# Patient Record
Sex: FEMALE | Race: BLACK OR AFRICAN AMERICAN | Employment: FULL TIME | URBAN - METROPOLITAN AREA
[De-identification: names, ages, dates, MRNs, and addresses within clinical notes are randomized per-mention and may not be internally consistent; named-entity substitution may affect disease eponyms.]

---

## 2017-04-10 ENCOUNTER — NURSE ONLY (OUTPATIENT)
Dept: PRIMARY CARE CLINIC | Age: 27
End: 2017-04-10

## 2017-04-10 DIAGNOSIS — Z23 NEED FOR TUBERCULOSIS VACCINATION: Primary | ICD-10-CM

## 2017-04-10 PROCEDURE — 86580 TB INTRADERMAL TEST: CPT | Performed by: INTERNAL MEDICINE

## 2017-04-18 ENCOUNTER — NURSE ONLY (OUTPATIENT)
Dept: PRIMARY CARE CLINIC | Age: 27
End: 2017-04-18

## 2017-04-18 DIAGNOSIS — Z11.1 ENCOUNTER FOR PPD TEST: Primary | ICD-10-CM

## 2017-04-18 DIAGNOSIS — Z23 NEED FOR TUBERCULOSIS VACCINATION: Primary | ICD-10-CM

## 2017-04-18 PROCEDURE — 86580 TB INTRADERMAL TEST: CPT | Performed by: INTERNAL MEDICINE

## 2017-04-24 ENCOUNTER — TELEPHONE (OUTPATIENT)
Dept: PRIMARY CARE CLINIC | Age: 27
End: 2017-04-24

## 2017-04-24 LAB
INDURATION: NORMAL
TB SKIN TEST: 0

## 2017-05-16 ENCOUNTER — OFFICE VISIT (OUTPATIENT)
Dept: PRIMARY CARE CLINIC | Age: 27
End: 2017-05-16

## 2017-05-16 VITALS
DIASTOLIC BLOOD PRESSURE: 70 MMHG | WEIGHT: 143 LBS | TEMPERATURE: 98.7 F | RESPIRATION RATE: 18 BRPM | HEIGHT: 72 IN | BODY MASS INDEX: 19.37 KG/M2 | OXYGEN SATURATION: 95 % | HEART RATE: 73 BPM | SYSTOLIC BLOOD PRESSURE: 112 MMHG

## 2017-05-16 DIAGNOSIS — Z00.00 ANNUAL PHYSICAL EXAM: Primary | ICD-10-CM

## 2017-05-16 PROCEDURE — 99395 PREV VISIT EST AGE 18-39: CPT | Performed by: INTERNAL MEDICINE

## 2017-05-16 ASSESSMENT — ENCOUNTER SYMPTOMS
VOMITING: 0
COUGH: 0
VISUAL CHANGE: 0
ABDOMINAL PAIN: 0
GASTROINTESTINAL NEGATIVE: 1
CHANGE IN BOWEL HABIT: 0
SWOLLEN GLANDS: 0
RESPIRATORY NEGATIVE: 1
SORE THROAT: 0
NAUSEA: 0
ALLERGIC/IMMUNOLOGIC NEGATIVE: 1
EYES NEGATIVE: 1

## 2017-11-08 ENCOUNTER — OFFICE VISIT (OUTPATIENT)
Dept: PRIMARY CARE CLINIC | Age: 27
End: 2017-11-08

## 2017-11-08 VITALS
DIASTOLIC BLOOD PRESSURE: 64 MMHG | HEIGHT: 72 IN | WEIGHT: 148 LBS | HEART RATE: 80 BPM | SYSTOLIC BLOOD PRESSURE: 118 MMHG | BODY MASS INDEX: 20.05 KG/M2

## 2017-11-08 DIAGNOSIS — Z00.00 ANNUAL PHYSICAL EXAM: ICD-10-CM

## 2017-11-08 PROCEDURE — 99395 PREV VISIT EST AGE 18-39: CPT | Performed by: INTERNAL MEDICINE

## 2017-11-08 RX ORDER — NORGESTIMATE AND ETHINYL ESTRADIOL 0.25-0.035
1 KIT ORAL DAILY
COMMUNITY
End: 2019-12-13 | Stop reason: ALTCHOICE

## 2017-11-08 ASSESSMENT — ENCOUNTER SYMPTOMS
ABDOMINAL PAIN: 0
RESPIRATORY NEGATIVE: 1
GASTROINTESTINAL NEGATIVE: 1
EYES NEGATIVE: 1

## 2017-11-08 ASSESSMENT — PATIENT HEALTH QUESTIONNAIRE - PHQ9
1. LITTLE INTEREST OR PLEASURE IN DOING THINGS: 0
SUM OF ALL RESPONSES TO PHQ9 QUESTIONS 1 & 2: 0
2. FEELING DOWN, DEPRESSED OR HOPELESS: 0
SUM OF ALL RESPONSES TO PHQ QUESTIONS 1-9: 0

## 2017-11-08 NOTE — PATIENT INSTRUCTIONS
Get the MMR vaccine as directed to complete your employment physical. You are medically cleared for employment pending the completion of the MMR vaccination. See me in one year.

## 2017-11-09 NOTE — PROGRESS NOTES
Bakari Medina  YOB: 1990    Date of Service:  11/8/2017    Chief Complaint   Patient presents with    Employment Physical     Needs MMR for work physical.    Other   Chronicity: Work physical. The current episode started today. The problem occurs constantly. The problem has been unchanged. Pertinent negatives include no abdominal pain, anorexia, arthralgias, fatigue, headaches, joint swelling or myalgias. Nothing aggravates the symptoms. She has tried nothing for the symptoms. The treatment provided no relief. No Known Allergies  Outpatient Prescriptions Marked as Taking for the 11/8/17 encounter (Office Visit) with Nicolas Pringle MD   Medication Sig Dispense Refill    norgestimate-ethinyl estradiol (ORTHO-CYCLEN) 0.25-35 MG-MCG per tablet Take 1 tablet by mouth daily         Immunization History   Administered Date(s) Administered    PPD Test 04/10/2017, 04/18/2017    Tdap (Boostrix, Adacel) 10/02/2014, 06/07/2016       Past Medical History:   Diagnosis Date    Chronic right-sided thoracic back pain 6/7/2016     No past surgical history on file. Family History   Problem Relation Age of Onset    High Blood Pressure Mother        Review of Systems:  Review of Systems   Constitutional: Negative. Negative for fatigue. HENT: Negative. Eyes: Negative. Respiratory: Negative. Cardiovascular: Negative. Gastrointestinal: Negative. Negative for abdominal pain and anorexia. Genitourinary: Negative. Musculoskeletal: Negative. Negative for arthralgias, joint swelling and myalgias. Skin: Negative. Neurological: Negative. Negative for headaches. Psychiatric/Behavioral: Negative. Vitals:    11/08/17 1507   BP: 118/64   Pulse: 80   Weight: 148 lb (67.1 kg)   Height: 6' (1.829 m)     Body mass index is 20.07 kg/m².      Wt Readings from Last 3 Encounters:   11/08/17 148 lb (67.1 kg)   05/16/17 143 lb (64.9 kg)   04/07/17 150 lb (68 kg)     BP Readings from Last 3 Encounters:   11/08/17 118/64   05/16/17 112/70   04/07/17 127/70         Physical Exam   Constitutional: She is oriented to person, place, and time. She appears well-developed and well-nourished. HENT:   Head: Normocephalic and atraumatic. Right Ear: External ear normal.   Left Ear: External ear normal.   Nose: Nose normal.   Eyes: Conjunctivae and EOM are normal. Pupils are equal, round, and reactive to light. Neck: Normal range of motion. Neck supple. No JVD present. No tracheal deviation present. No thyromegaly present. Cardiovascular: Normal rate, regular rhythm, normal heart sounds and intact distal pulses. Exam reveals no gallop and no friction rub. No murmur heard. Pulmonary/Chest: Effort normal and breath sounds normal. No respiratory distress. She has no wheezes. She has no rales. She exhibits no tenderness. Abdominal: Soft. Bowel sounds are normal.   Musculoskeletal: Normal range of motion. Lymphadenopathy:     She has no cervical adenopathy. Neurological: She is alert and oriented to person, place, and time. She has normal reflexes. No cranial nerve deficit. Coordination normal.   Skin: Skin is warm and dry. Psychiatric: She has a normal mood and affect. Her behavior is normal. Judgment and thought content normal.       Lab Review   No visits with results within 6 Month(s) from this visit. Latest known visit with results is:   Nurse Only on 04/18/2017   Component Date Value    TB Skin Test 04/24/2017 0.0          Health Maintenance   Topic Date Due    Flu vaccine (1) 12/15/2017 (Originally 9/1/2017)    Cervical cancer screen  06/07/2019    DTaP/Tdap/Td vaccine (2 - Td) 06/07/2026    HIV screen  Completed          Assessment/Plan:    Annual physical exam  Cleared for employment pending the MMR vaccination      1. Annual physical exam         Return in about 1 year (around 11/8/2018).

## 2018-09-26 PROBLEM — Z00.00 ANNUAL PHYSICAL EXAM: Status: RESOLVED | Noted: 2017-05-16 | Resolved: 2018-09-26

## 2018-11-05 ENCOUNTER — HOSPITAL ENCOUNTER (EMERGENCY)
Age: 28
Discharge: HOME | End: 2018-11-05
Attending: EMERGENCY MEDICINE
Payer: COMMERCIAL

## 2018-11-05 VITALS
DIASTOLIC BLOOD PRESSURE: 76 MMHG | TEMPERATURE: 98.1 F | RESPIRATION RATE: 14 BRPM | HEART RATE: 79 BPM | OXYGEN SATURATION: 99 % | WEIGHT: 140 LBS | SYSTOLIC BLOOD PRESSURE: 147 MMHG | BODY MASS INDEX: 18.96 KG/M2 | HEIGHT: 72 IN

## 2018-11-05 DIAGNOSIS — H93.11 RINGING IN EAR, RIGHT: Primary | ICD-10-CM

## 2018-11-05 PROCEDURE — 99283 EMERGENCY DEPT VISIT LOW MDM: CPT

## 2018-12-20 ENCOUNTER — OFFICE VISIT (OUTPATIENT)
Dept: PRIMARY CARE CLINIC | Age: 28
End: 2018-12-20
Payer: COMMERCIAL

## 2018-12-20 VITALS
BODY MASS INDEX: 20.32 KG/M2 | HEIGHT: 72 IN | WEIGHT: 150 LBS | RESPIRATION RATE: 18 BRPM | OXYGEN SATURATION: 99 % | TEMPERATURE: 97.9 F | SYSTOLIC BLOOD PRESSURE: 116 MMHG | DIASTOLIC BLOOD PRESSURE: 66 MMHG | HEART RATE: 83 BPM

## 2018-12-20 DIAGNOSIS — F32.89 OTHER DEPRESSION: Primary | ICD-10-CM

## 2018-12-20 DIAGNOSIS — M54.5 CHRONIC BILATERAL LOW BACK PAIN, WITH SCIATICA PRESENCE UNSPECIFIED: ICD-10-CM

## 2018-12-20 DIAGNOSIS — M54.6 CHRONIC RIGHT-SIDED THORACIC BACK PAIN: ICD-10-CM

## 2018-12-20 DIAGNOSIS — G89.29 CHRONIC BILATERAL LOW BACK PAIN, WITH SCIATICA PRESENCE UNSPECIFIED: ICD-10-CM

## 2018-12-20 DIAGNOSIS — M54.6 BILATERAL THORACIC BACK PAIN, UNSPECIFIED CHRONICITY: ICD-10-CM

## 2018-12-20 DIAGNOSIS — G89.29 CHRONIC RIGHT-SIDED THORACIC BACK PAIN: ICD-10-CM

## 2018-12-20 PROBLEM — F32.A DEPRESSION: Status: ACTIVE | Noted: 2018-12-20

## 2018-12-20 PROCEDURE — 1036F TOBACCO NON-USER: CPT | Performed by: INTERNAL MEDICINE

## 2018-12-20 PROCEDURE — G8484 FLU IMMUNIZE NO ADMIN: HCPCS | Performed by: INTERNAL MEDICINE

## 2018-12-20 PROCEDURE — 99214 OFFICE O/P EST MOD 30 MIN: CPT | Performed by: INTERNAL MEDICINE

## 2018-12-20 PROCEDURE — G8420 CALC BMI NORM PARAMETERS: HCPCS | Performed by: INTERNAL MEDICINE

## 2018-12-20 PROCEDURE — G8427 DOCREV CUR MEDS BY ELIG CLIN: HCPCS | Performed by: INTERNAL MEDICINE

## 2018-12-20 RX ORDER — METHYLPREDNISOLONE 4 MG/1
TABLET ORAL
Qty: 1 KIT | Refills: 0 | Status: SHIPPED | OUTPATIENT
Start: 2018-12-20 | End: 2018-12-26

## 2018-12-20 RX ORDER — CYCLOBENZAPRINE HCL 10 MG
10 TABLET ORAL NIGHTLY
Qty: 30 TABLET | Refills: 1 | Status: SHIPPED | OUTPATIENT
Start: 2018-12-20 | End: 2019-12-13 | Stop reason: ALTCHOICE

## 2018-12-20 RX ORDER — IBUPROFEN 800 MG/1
800 TABLET ORAL EVERY 6 HOURS PRN
Qty: 120 TABLET | Refills: 3 | Status: SHIPPED | OUTPATIENT
Start: 2018-12-20 | End: 2019-12-13 | Stop reason: ALTCHOICE

## 2018-12-20 RX ORDER — PAROXETINE HYDROCHLORIDE 20 MG/1
20 TABLET, FILM COATED ORAL EVERY MORNING
Qty: 30 TABLET | Refills: 3 | Status: SHIPPED | OUTPATIENT
Start: 2018-12-20 | End: 2019-12-13 | Stop reason: ALTCHOICE

## 2018-12-20 ASSESSMENT — ENCOUNTER SYMPTOMS
EYES NEGATIVE: 1
GASTROINTESTINAL NEGATIVE: 1
RESPIRATORY NEGATIVE: 1
BACK PAIN: 1

## 2018-12-20 ASSESSMENT — PATIENT HEALTH QUESTIONNAIRE - PHQ9
SUM OF ALL RESPONSES TO PHQ QUESTIONS 1-9: 2
1. LITTLE INTEREST OR PLEASURE IN DOING THINGS: 1
SUM OF ALL RESPONSES TO PHQ QUESTIONS 1-9: 2
SUM OF ALL RESPONSES TO PHQ9 QUESTIONS 1 & 2: 2
2. FEELING DOWN, DEPRESSED OR HOPELESS: 1

## 2018-12-20 NOTE — PATIENT INSTRUCTIONS
Instructions    A relapse of depression means your symptoms have come back after you have gotten better. This illness often comes and goes during a lifetime. But there are many things you can do to keep it from coming back. Follow-up care is a key part of your treatment and safety. Be sure to make and go to all appointments, and call your doctor if you are having problems. It's also a good idea to know your test results and keep a list of the medicines you take. What do you need to know? Know your risk of relapse  Talk to your doctor to find out if you are at risk of relapse. Many things can make a person more likely to relapse into depression. These include having a family member with depression, dealing with serious problems in a relationship or a job, having a serious medical condition, or abusing drugs or alcohol. It is important to know your risk and to recognize warning signs of relapse. Once you know these things, you will be better able to keep it from happening to you. Know the warning signs of relapse  The two most common signs of relapse are:  · Feeling sad or hopeless. · Losing interest in your daily activities. You may have other symptoms, such as:  · You lose or gain weight. · You sleep too much or not enough. · You feel restless and unable to sit still. · You feel unable to move. · You feel tired all the time. · You feel unworthy or guilty without an obvious reason. · You have problems concentrating, remembering, or making decisions. · You think often about death or suicide. · You feel angry or have panic attacks. How can you care for yourself at home? · Take your medicine as prescribed. Call your doctor if you have any problems with your medicine. Many people take their medicines for at least 6 months after they have recovered. This often helps keep symptoms from coming back.  However, if your depression keeps coming back, you may have to take medicine for the rest of your depression? Depression is an illness that affects how a person feels, thinks, and acts. It's different from normal feelings of sadness or grief. A person who has depression may have less energy. He or she may lose interest in daily activities and may feel sad and grouchy for a long time. Depression is a common illness. It affects men and women of all ages and backgrounds. Many people, and sometimes their families, feel embarrassed or ashamed about having depression. But it isn't a sign of personal weakness. It's not a character flaw. A person who is depressed is not \"crazy. \" Depression is a medical illness. It's caused by changes in the natural chemicals in the brain. Most experts believe that a combination of family history (a person's genes) and stressful life events can cause depression. Health problems may also cause depression or make it worse. It's common for people with long-term (chronic) health problems like coronary artery disease, diabetes, cancer, or chronic pain to feel depressed. It's important to know that depression can be treated. The first step toward feeling better is often just seeing that the problem exists. What are the symptoms? The symptoms of depression may be hard to notice at first. They vary among people, and it's easy to confuse them with just feeling \"off. \" The two most common symptoms are:  · Feeling sad or hopeless nearly every day for at least 2 weeks. · Losing interest in or not getting pleasure from most activities that used to be enjoyable, and feeling this way nearly every day for at least 2 weeks. Other symptoms may appear. A person with depression may, almost every day:  · Eat or sleep more or less than usual.  · Feel tired. · Feel unworthy or guilty. · Find it hard to focus, remember things, or make decisions. A serious symptom of depression is thinking about death or suicide.  If you or someone you care about talks about this or about feeling hopeless, get help your medicines exactly as prescribed. Call your doctor if you think you are having a problem with your medicine. · Do not make major life decisions while you are depressed. Depression may change the way you think. You will be able to make better decisions after you feel better. · Think positively. Challenge negative thoughts with statements such as \"I am hopeful\"; \"Things will get better\"; and \"I can ask for the help I need. \" Write down these statements and read them often, even if you don't believe them yet. · Be patient with yourself. It took time for your depression to develop, and it will take time for your symptoms to improve. Do not take on too much or be too hard on yourself. · Learn all you can about depression from written and online materials. · Check out behavioral health classes to learn more about dealing with depression. · Keep the numbers for these national suicide hotlines: 1-593-567-TALK (0-111.724.3633) and 9-355-VOVGUPG (5-844.104.7043). If you or someone you know talks about suicide or feeling hopeless, get help right away. When should you call for help? Call 911 anytime you think you may need emergency care. For example, call if:    · You feel you cannot stop from hurting yourself or someone else.   [facility] your doctor now or seek immediate medical care if:    · You hear voices.     · You feel much more depressed.    Watch closely for changes in your health, and be sure to contact your doctor if:    · You are having problems with your depression medicine.     · You are not getting better as expected. Where can you learn more? Go to https://Plan[employer] Paymentsandraeweb.[employer]. org and sign in to your [employer] account. Enter P272 in the [employer] box to learn more about \"Depression Treatment: Care Instructions. \"     If you do not have an account, please click on the \"Sign Up Now\" link. Current as of: December 7, 2017  Content Version: 11.8  © 1423-4120 Healthwise, Incorporated. takes time, and your mood will improve little by little. Stay active  · Stay busy and get outside. Take a walk, or try some other light exercise. · Talk with your doctor about an exercise program. Exercise can help with mild depression. · Go to a movie or concert. Take part in a Holiness activity or other social gathering. Go to a ball game. · Ask a friend to have dinner with you. Take care of yourself  · Eat a balanced diet with plenty of fresh fruits and vegetables, whole grains, and lean protein. If you have lost your appetite, eat small snacks rather than large meals. · Avoid drinking alcohol or using illegal drugs. Do not take medicines that have not been prescribed for you. They may interfere with medicines you may be taking for depression, or they may make your depression worse. · Take your medicines exactly as they are prescribed. You may start to feel better within 1 to 3 weeks of taking antidepressant medicine. But it can take as many as 6 to 8 weeks to see more improvement. If you have questions or concerns about your medicines, or if you do not notice any improvement by 3 weeks, talk to your doctor. · If you have any side effects from your medicine, tell your doctor. Antidepressants can make you feel tired, dizzy, or nervous. Some people have dry mouth, constipation, headaches, sexual problems, or diarrhea. Many of these side effects are mild and will go away on their own after you have been taking the medicine for a few weeks. Some may last longer. Talk to your doctor if side effects are bothering you too much. You might be able to try a different medicine. · Get enough sleep. If you have problems sleeping:  ? Go to bed at the same time every night, and get up at the same time every morning. ? Keep your bedroom dark and quiet. ? Do not exercise after 5:00 p.m.  ? Avoid drinks with caffeine after 5:00 p.m. · Avoid sleeping pills unless they are prescribed by the doctor treating your depression. Sleeping pills may make you groggy during the day, and they may interact with other medicine you are taking. · If you have any other illnesses, such as diabetes, heart disease, or high blood pressure, make sure to continue with your treatment. Tell your doctor about all of the medicines you take, including those with or without a prescription. · Keep the numbers for these national suicide hotlines: 2-024-108-TALK (2-491.533.5186) and 3-875-OJMWQYP (9-706.992.1726). If you or someone you know talks about suicide or feeling hopeless, get help right away. When should you call for help? Call 911 anytime you think you may need emergency care. For example, call if:    · You feel like hurting yourself or someone else.     · Someone you know has depression and is about to attempt or is attempting suicide.   [facility] your doctor now or seek immediate medical care if:    · You hear voices.     · Someone you know has depression and:  ? Starts to give away his or her possessions. ? Uses illegal drugs or drinks alcohol heavily. ? Talks or writes about death, including writing suicide notes or talking about guns, knives, or pills. ? Starts to spend a lot of time alone. ? Acts very aggressively or suddenly appears calm.    Watch closely for changes in your health, and be sure to contact your doctor if:    · You do not get better as expected. Where can you learn more? Go to https://[employer]. org and sign in to your [employer] account. Enter N872 in the [employer] box to learn more about \"Recovering From Depression: Care Instructions. \"     If you do not have an account, please click on the \"Sign Up Now\" link. Current as of: December 7, 2017  Content Version: 11.8  © 3635-4022 Healthwise, Incorporated. Care instructions adapted under license by [facility] ([facility]).  If you have questions about a medical condition or this instruction, always ask your healthcare professional. Shanonn Patrick

## 2019-12-13 ENCOUNTER — OFFICE VISIT (OUTPATIENT)
Dept: PRIMARY CARE CLINIC | Age: 29
End: 2019-12-13
Payer: COMMERCIAL

## 2019-12-13 VITALS
HEART RATE: 84 BPM | SYSTOLIC BLOOD PRESSURE: 110 MMHG | HEIGHT: 72 IN | BODY MASS INDEX: 21.54 KG/M2 | DIASTOLIC BLOOD PRESSURE: 70 MMHG | WEIGHT: 159 LBS

## 2019-12-13 DIAGNOSIS — Z00.00 PHYSICAL EXAM: ICD-10-CM

## 2019-12-13 DIAGNOSIS — F32.A ANXIETY AND DEPRESSION: ICD-10-CM

## 2019-12-13 DIAGNOSIS — Z00.00 PHYSICAL EXAM: Primary | ICD-10-CM

## 2019-12-13 DIAGNOSIS — F41.9 ANXIETY AND DEPRESSION: ICD-10-CM

## 2019-12-13 DIAGNOSIS — Z71.85 VACCINE COUNSELING: ICD-10-CM

## 2019-12-13 LAB
A/G RATIO: 1.7 (ref 1.1–2.2)
ALBUMIN SERPL-MCNC: 4.3 G/DL (ref 3.4–5)
ALP BLD-CCNC: 56 U/L (ref 40–129)
ALT SERPL-CCNC: 8 U/L (ref 10–40)
ANION GAP SERPL CALCULATED.3IONS-SCNC: 15 MMOL/L (ref 3–16)
AST SERPL-CCNC: 15 U/L (ref 15–37)
BASOPHILS ABSOLUTE: 0.1 K/UL (ref 0–0.2)
BASOPHILS RELATIVE PERCENT: 0.9 %
BILIRUB SERPL-MCNC: 0.3 MG/DL (ref 0–1)
BILIRUBIN URINE: NEGATIVE
BLOOD, URINE: NEGATIVE
BUN BLDV-MCNC: 13 MG/DL (ref 7–20)
CALCIUM SERPL-MCNC: 8.9 MG/DL (ref 8.3–10.6)
CHLORIDE BLD-SCNC: 104 MMOL/L (ref 99–110)
CHOLESTEROL, TOTAL: 153 MG/DL (ref 0–199)
CLARITY: CLEAR
CO2: 22 MMOL/L (ref 21–32)
COLOR: YELLOW
CREAT SERPL-MCNC: 0.9 MG/DL (ref 0.6–1.1)
EOSINOPHILS ABSOLUTE: 0.2 K/UL (ref 0–0.6)
EOSINOPHILS RELATIVE PERCENT: 2.1 %
GFR AFRICAN AMERICAN: >60
GFR NON-AFRICAN AMERICAN: >60
GLOBULIN: 2.6 G/DL
GLUCOSE BLD-MCNC: 93 MG/DL (ref 70–99)
GLUCOSE URINE: NEGATIVE MG/DL
HCG(URINE) PREGNANCY TEST: NEGATIVE
HCT VFR BLD CALC: 37.5 % (ref 36–48)
HDLC SERPL-MCNC: 74 MG/DL (ref 40–60)
HEMOGLOBIN: 12.2 G/DL (ref 12–16)
KETONES, URINE: ABNORMAL MG/DL
LDL CHOLESTEROL CALCULATED: 66 MG/DL
LEUKOCYTE ESTERASE, URINE: NEGATIVE
LYMPHOCYTES ABSOLUTE: 2.3 K/UL (ref 1–5.1)
LYMPHOCYTES RELATIVE PERCENT: 32.4 %
MCH RBC QN AUTO: 28.2 PG (ref 26–34)
MCHC RBC AUTO-ENTMCNC: 32.5 G/DL (ref 31–36)
MCV RBC AUTO: 86.9 FL (ref 80–100)
MICROSCOPIC EXAMINATION: ABNORMAL
MONOCYTES ABSOLUTE: 0.5 K/UL (ref 0–1.3)
MONOCYTES RELATIVE PERCENT: 7 %
NEUTROPHILS ABSOLUTE: 4.1 K/UL (ref 1.7–7.7)
NEUTROPHILS RELATIVE PERCENT: 57.6 %
NITRITE, URINE: NEGATIVE
PDW BLD-RTO: 13.3 % (ref 12.4–15.4)
PH UA: 7.5 (ref 5–8)
PLATELET # BLD: 231 K/UL (ref 135–450)
PMV BLD AUTO: 10.6 FL (ref 5–10.5)
POTASSIUM SERPL-SCNC: 4.2 MMOL/L (ref 3.5–5.1)
PROTEIN UA: NEGATIVE MG/DL
RBC # BLD: 4.31 M/UL (ref 4–5.2)
SODIUM BLD-SCNC: 141 MMOL/L (ref 136–145)
SPECIFIC GRAVITY UA: 1.03 (ref 1–1.03)
TOTAL PROTEIN: 6.9 G/DL (ref 6.4–8.2)
TRIGL SERPL-MCNC: 67 MG/DL (ref 0–150)
TSH SERPL DL<=0.05 MIU/L-ACNC: 0.54 UIU/ML (ref 0.27–4.2)
URINE TYPE: ABNORMAL
UROBILINOGEN, URINE: 1 E.U./DL
VITAMIN D 25-HYDROXY: 18.9 NG/ML
VLDLC SERPL CALC-MCNC: 13 MG/DL
WBC # BLD: 7.1 K/UL (ref 4–11)

## 2019-12-13 PROCEDURE — G8420 CALC BMI NORM PARAMETERS: HCPCS | Performed by: INTERNAL MEDICINE

## 2019-12-13 PROCEDURE — 99214 OFFICE O/P EST MOD 30 MIN: CPT | Performed by: INTERNAL MEDICINE

## 2019-12-13 PROCEDURE — G8427 DOCREV CUR MEDS BY ELIG CLIN: HCPCS | Performed by: INTERNAL MEDICINE

## 2019-12-13 PROCEDURE — 1036F TOBACCO NON-USER: CPT | Performed by: INTERNAL MEDICINE

## 2019-12-13 PROCEDURE — G8484 FLU IMMUNIZE NO ADMIN: HCPCS | Performed by: INTERNAL MEDICINE

## 2019-12-13 ASSESSMENT — ENCOUNTER SYMPTOMS
DIARRHEA: 0
ABDOMINAL DISTENTION: 0
CHEST TIGHTNESS: 0
COUGH: 0
GASTROINTESTINAL NEGATIVE: 1
CONSTIPATION: 0
SHORTNESS OF BREATH: 0
EYES NEGATIVE: 1
ABDOMINAL PAIN: 0
WHEEZING: 0
BLOOD IN STOOL: 0

## 2019-12-14 LAB
ESTIMATED AVERAGE GLUCOSE: 93.9 MG/DL
HBA1C MFR BLD: 4.9 %

## 2019-12-16 RX ORDER — ERGOCALCIFEROL 1.25 MG/1
50000 CAPSULE ORAL WEEKLY
Qty: 12 CAPSULE | Refills: 1 | Status: SHIPPED | OUTPATIENT
Start: 2019-12-16 | End: 2022-09-15

## 2020-09-11 ENCOUNTER — TELEPHONE (OUTPATIENT)
Dept: PRIMARY CARE CLINIC | Age: 30
End: 2020-09-11

## 2020-09-14 ENCOUNTER — OFFICE VISIT (OUTPATIENT)
Dept: PRIMARY CARE CLINIC | Age: 30
End: 2020-09-14
Payer: COMMERCIAL

## 2020-09-14 VITALS
DIASTOLIC BLOOD PRESSURE: 82 MMHG | TEMPERATURE: 98.1 F | OXYGEN SATURATION: 100 % | WEIGHT: 158 LBS | SYSTOLIC BLOOD PRESSURE: 128 MMHG | BODY MASS INDEX: 21.4 KG/M2 | HEART RATE: 88 BPM | HEIGHT: 72 IN

## 2020-09-14 PROCEDURE — G8420 CALC BMI NORM PARAMETERS: HCPCS | Performed by: INTERNAL MEDICINE

## 2020-09-14 PROCEDURE — G8427 DOCREV CUR MEDS BY ELIG CLIN: HCPCS | Performed by: INTERNAL MEDICINE

## 2020-09-14 PROCEDURE — 1036F TOBACCO NON-USER: CPT | Performed by: INTERNAL MEDICINE

## 2020-09-14 PROCEDURE — 99214 OFFICE O/P EST MOD 30 MIN: CPT | Performed by: INTERNAL MEDICINE

## 2020-09-14 PROCEDURE — 1111F DSCHRG MED/CURRENT MED MERGE: CPT | Performed by: INTERNAL MEDICINE

## 2020-09-14 RX ORDER — METRONIDAZOLE 7.5 MG/G
1 GEL VAGINAL 2 TIMES DAILY
Qty: 70 G | Refills: 0 | Status: SHIPPED | OUTPATIENT
Start: 2020-09-14 | End: 2020-11-25

## 2020-09-14 RX ORDER — DIVALPROEX SODIUM 250 MG/1
250 TABLET, DELAYED RELEASE ORAL 2 TIMES DAILY
Qty: 60 TABLET | Refills: 3 | Status: SHIPPED | OUTPATIENT
Start: 2020-09-14 | End: 2022-02-13 | Stop reason: ALTCHOICE

## 2020-09-14 RX ORDER — OMEPRAZOLE 20 MG/1
20 CAPSULE, DELAYED RELEASE ORAL
Qty: 30 CAPSULE | Refills: 5 | Status: SHIPPED | OUTPATIENT
Start: 2020-09-14 | End: 2022-02-13 | Stop reason: ALTCHOICE

## 2020-09-14 NOTE — PROGRESS NOTES
Post-Discharge Transitional Care Management Services or Hospital Follow Up      Broderick Lopez   YOB: 1990    Date of Office Visit:  9/14/2020  Date of Hospital Admission: emergency room visit on September 10, 2020  Date of Hospital Discharge: emergency room visit on September 10, 2020  Readmission Risk Score(high >=14%. Medium >=10%):No data recorded   while at work felt like two people were in head. One said to fight depression. The other voice said, that she wanted to die and to give up. Patient started crying uncontrollably and was taken to hospital and was evaluated and released. She is a single parent of sick child. She never had a plan. Patient has been prescribed medication for depression , and was referred to a psychiatrist, but patient never gone. Her mother discourage her from taking the medications. Thoughts race and do not turn off. Patient can not sleep due to thoughts not turning off. · [facility]   Location   Jump to Section ? Active and Recently Administered MedicationsAdministered MedicationsDischarge DispositionDischarge InstructionsDocument InformationED NotesEncounter DetailsLab ResultsLast Filed Vital SignsMedications at Time of DischargeOrdersPatient ContactsPatient DemographicsPlan of TreatmentProceduresProgress NotesReason for VisitSocial HistoryVisit Diagnoses  Dulcie Balint - 26 y.o.  Female; born [birth year] Encounter Summary, generated on Sep. 14, 2020September 14, 2020   Additional Documents for This Encounter  Reason for Visit    Reason Comments   Suicidal     Encounter Details    Date Type Department Care Team Description   09/10/2020 [facility] Emergency Department    [address] [facility] [address]   [phone]   Montez Mcdaniel MD    [address]    [address]   [phone] (Fax)       Social History  - documented as of this encounter  Tobacco Use Types Packs/Day Years Used Date   Never Smoker           Smokeless Tobacco: Never Used           Alcohol Use Drinks/Week oz/Week Comments   Yes     occ     Sex Assigned at Birth Date Recorded   Not on file       COVID-19 Exposure Response Date Recorded   In the last month, have you been in contact with someone who was confirmed or suspected to have Coronavirus / COVID-19? No / Unsure 9/10/2020 10:52 AM EDT   Last Filed Vital Signs  - documented in this encounter  Vital Sign Reading Time Taken Comments   Blood Pressure 136/86 09/10/2020 10:51 AM EDT     Pulse 108 09/10/2020 10:51 AM EDT     Temperature 37.6 °C (99.7 °F) 09/10/2020 10:53 AM EDT     Respiratory Rate 20 09/10/2020 10:51 AM EDT     Oxygen Saturation 100% 09/10/2020 10:51 AM EDT     Inhaled Oxygen Concentration - -     Weight 68 kg (150 lb) 09/10/2020 10:51 AM EDT     Height 182.9 cm (6') 09/10/2020 10:51 AM EDT     Body Mass Index 20.34 09/10/2020 10:51 AM EDT     Discharge Instructions  - documented in this encounter  Table of Contents for Discharge Instructions   Instructions   Attachments      Instructions  Vincent De Paz PA-C - 09/10/2020    Return promptly for specific suicidal ideation. Patient has contracted for safety. She has spoken to the behavioral health nurse at length. Plans are for outpatient treatment.       Back to top of Discharge Instructions  Attachments  The following attachments cannot be sent through Care Everywhere.    DEPRESSION, ADULT (ENGLISH)  Depression Discharge Instructions, Adult (English)  SUICIDAL FEELINGS: HOW TO HELP YOURSELF (ENGLISH)  Back to top of Discharge Instructions  Medications at Time of Discharge  - documented as of this encounter  Medication Sig Dispensed Refills Start Date End Date   Prenat w/o S-CZ-Kllfnek-FA-DHA (PNV-DHA) 27-0.6-0.4-300 MG CAPS   Take 1 capsule by mouth daily.  30 capsule   0 09/24/2019     Discharge Disposition  - documented in this encounter  Disposition Code [facility] Destination   Home or Self Care     Left the Emergency Department   Progress Notes  - documented in this encounter  Dominique Richmond MD - 09/10/2020 3:13 PM EDT  CURRENT STATUS IS EMERGENCY .    :    Any questions regarding PATIENT CLASS/STATUS should be directed to the Care Management Departments:  [facility] [phone] or [facility] [phone] or [facility] [phone].    Electronically signed by Dominique Richmond MD at 09/11/2020 7:02 AM EDT    ED Notes  - documented in this encounter  Table of Contents for ED Notes   Lucia Carey - 09/10/2020 3:13 PM EDT   Emelyn Mendoza, Registered Nurse - 09/10/2020 1:02 PM EDT   Abigail Dan Nurse - 09/10/2020 11:40 AM EDT   Abigail Armstrong Nurse - 09/10/2020 10:53 AM EDT      Lucia Carey - 09/10/2020 3:13 PM EDT  Security called to return belongings to patient for discharge     Reviewed discharge instructions, medication reconciliation, and patient education information with patient. Patient stated understanding, and answered questions to satisfaction. Patient verbalized satisfaction of care. Patient ambulated safely to exit with a steady gate in no obvious acute distress. Patient verbalized understanding of returning to ER if symptoms worsen, and to follow up with primary health care provider.     Electronically signed by Lucia Carey at 09/10/2020 3:13 PM EDT    Back to top of ED Notes  Emelyn Mendoza, Registered Nurse - 09/10/2020 1:02 PM EDT  When pt. Asked, Celia Gallo brings you in today?\", pt. Reports \"Life\". Pt. Reports that she called her grandma to ask her to bring her to ED because, \"I'm overwhelmed\". Pt has two children, ages 6 and 11, both recently went back to school. She states that neither of her childrens' fathers help her enough. 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 She went to work today and was [place] a rough day\". She has felt for weeks, like she needs relief. She states she has had some thoughts of hurting herself but she does not have a plan. Pt. States, \"I'm not going to commit suicide\". She has supportive grandma, cousin, and brother. Pt. Reports that she has not been taking care of herself. She has been seen previously for depression and was prescribed medication but states, \"my mom told me not to take it. \". Pt. Is tearful. States, \"I feel emotional\".     Electronically signed by Aayush Goldberg Registered Nurse at 09/10/2020 1:07 PM EDT    Back to top of ED Notes  Judie Barnes, Registered Nurse - 09/10/2020 11:40 AM EDT  Psychiatric Intake Evaluation [address]    Pt presents to [facility] ED with chief complaints of: Suicidal     Pt was brought to [facility] ED by grandmother due to SI after pt reported she had a [place] down\" while at work. Pt reported she had \"a thought\" about suicide but now denied all intent and plan. Pt reported she has felt depressed past 2-3 weeks, has not sought treatment until today. Precipitating Factors/Stressors: When initially asked pt reported she \"has been stressed by life\", \"it's too much\". When asked for specific information, Pt reported she often \"feels sick\", reported she has not been sleeping well, reported she often feels she is \"not good enough\" as a mother. Pt reported she also has some financial struggles with no support from fathers of children. Pt reported she \"needs to rest\".      Psychiatric History: depression-pt reported she had been told by her PCP 2 years ago but has not sought out any further treatment     Outpatient Mental Health Provider: None     Psychiatric Medications: None   Pt was prescribed medication, does not remember what it was, never took it as pt reported her mother encouraged her at that time to not take it     Psychiatric Hospitalizations: none     Social Support: Grandmother (present)    Mental Status Exam  Appearance: Dressed in scrubs, in no apparent distress, was tearful at one point when discussing her children   Mood: Depressed   Affect: Congruent   Orientation: X4  Cognition: Alert   Speech: Regular rate, rhythm   Thought Process: Organized, logical, linear  Thought Content: No delusional content  Perception/Hallucinations: Pt denied. Pt did not appear to be responding to any internal stimuli   Memory: Fair   Insight/Judgement: Fair   Impulse control: Good   Suicidal: Pt denied acute/active and passive SI at present   Access to weapons: none   Past suicidal attempts: none   C-SSR Score: high   Homicidal: Pt denied   Past homicidal attempts: none     Depression: Pt reported symptoms of having daily living feel like \"a struggle\", not eating with \"feeling sick\", feeling \"physicaly drained\". Anxiety: Pt did not report symptoms when asked but later reported she \"worries too much\" about her children and finances   Sleep: Decreased   Appetite: Decreased     Physical, Sexual, Emotional Abuse:   1. Does patient have a history of sexual assault, offence, or abuse? No     2. Is the patient exhibiting any violence, abusive behavior, or sexually inappropriate behavior? No     3. Does the patient state intent for sexual activity? No     4. What is the patient's cognitive status at this time? Pt was alert, oriented, denied SI/HI, intent, and plan, reported she could be safe if discharged. Pt's grandmother reported she is going to have pt return to her home today and have throughout the weekend where she will care for pt's children and will allow pt to sleep/rest. Both pt and grandmother were in support of plan. Pt reported she did not want an admission as she feels she could not be  from her children. Grandmother reported she felt that if pt was to be given medication versus an admission that symptoms would be improved, gave herself as an example that she took depression medicine once several years ago and reported \"I was fine\".  Writer gave pt and family education, strongly encouraged pt to follow up with outpatient therapy as well as possible medication. 5. Is the patient currently medicated? No     Alcohol or Substance Abuse: none     Resources were provided in the form of written material for the following: Pt was given referrals to all area agencies with instructions including [facility]. Writer and pt discussed benefits to therapy, outpt mental health treatment. Pt was agreeable, grandmother reported she will assist pt with follow up. Confer with ED Physician and disposition pending.         Electronically signed by Abigail Root Nurse at 09/11/2020 7:54 AM EDT    Back to top of ED Notes  Abigail Hoskins Nurse - 09/10/2020 10:53 AM EDT  Pt arrives today with si. She denies HI. Pt reports hx of depression a few years ago was told to go on antidepressants but her mother told her not to take them. Pt was brought in by her grandmother. When asked if she had thoughts, pt replied 'yes\". She denies plan or hx of hurting herself.  Denies having a therapist. She also denies any ETOH or drug use.    Electronically signed by Abigail Hoskins at 09/10/2020 10:55 AM EDT    Back to top of ED Notes  Plan of Treatment  - documented as of this encounter  Pending Results  Pending Results   Name Type Priority Associated Diagnoses Date/Time   Urinalysis with Reflex to Microscopic and Culture Lab STAT   09/10/2020 11:22 AM EDT     Scheduled Orders  Scheduled Orders   Name Type Priority Associated Diagnoses Order Schedule   Urinalysis with Reflex to Microscopic and Culture Lab STAT   STAT for 1 Occurrences starting 09/10/2020 until 09/10/2020   Procedures  - documented in this encounter  Procedure Name Priority Date/Time Associated Diagnosis Comments   BAMP (NA,K,CL,CO2,GLU,BUN,CREAT,CA) STAT 09/10/2020 11:54 AM EDT   Results for this procedure are in the results section.     HEPATIC FUNCTION PANEL (TBIL,DBIL,ALK,AST,ALT,TP,ALB) STAT 09/10/2020 11:54 AM EDT BILIRUBIN 0.2Comment: Tested at [facility] [place] [address] 0.0 - 0.2 mg/dL CHEMISTRY HEMATOLOGY       Hepatic Function Panel (TBIL,DBIL,ALK,AST,ALT,TP,ALB) (09/10/2020 11:54 AM EDT)   Specimen   Whole blood     Hepatic Function Panel (TBIL,DBIL,ALK,AST,ALT,TP,ALB) (09/10/2020 11:54 AM EDT)   Performing Organization Address City/[facility]/Zipcode Phone Number   [facility]  [address] [facility] [address], [address] [facility]  [phone]     CHEMISTRY HEMATOLOGY            Back to top of Lab Results       Ethanol Level (09/10/2020 11:54 AM EDT)  Ethanol Level (09/10/2020 11:54 AM EDT)   Component Value Ref Range Performed At Pathologist Signature   ETHANOL, SERUM <10Comment: Tested at [facility] [place] [address] <10.0 mg/dL CHEMISTRY HEMATOLOGY       Ethanol Level (09/10/2020 11:54 AM EDT)   Specimen   Whole blood     Ethanol Level (09/10/2020 11:54 AM EDT)   Performing Organization Address City/[facility] Phone Number   Ernie Pulliam, [address] [facility]  [phone]     CHEMISTRY HEMATOLOGY            Back to top of Lab Results       CBC with Differential (09/10/2020 11:54 AM EDT)  CBC with Differential (09/10/2020 11:54 AM EDT)   Component Value Ref Range Performed At Pathologist Signature   WBC 13.1 (H) 3.6 - 10.5 THOU/mcL CHEMISTRY HEMATOLOGY     RBC 4.20 3.80 - 5.20 MIL/mcL CHEMISTRY HEMATOLOGY     HEMOGLOBIN 11.9 (L) 12.0 - 15.2 g/dL CHEMISTRY HEMATOLOGY     HEMATOCRIT 36.2 36 - 46 % CHEMISTRY HEMATOLOGY     MCV 86.1 82 - 97 fL CHEMISTRY HEMATOLOGY     MCH 28.4 27 - 33 pg CHEMISTRY HEMATOLOGY     MCHC 33.0 32 - 36 g/dL CHEMISTRY HEMATOLOGY     RDW 13.3 12.3 - 17.0 % CHEMISTRY HEMATOLOGY     PLATELET 999 254 - 202 THOU/mcL CHEMISTRY HEMATOLOGY     MPV 10.0 7.4 - 11.5 fL CHEMISTRY HEMATOLOGY     ABS.  NEUTROPHIL 10.80 (H) 1.80 - 7.70 THOU/mcL CHEMISTRY HEMATOLOGY     ABS LYMPHS 1.40 1.00 - 4.00 THOU/mcL CHEMISTRY HEMATOLOGY     ABS MONOS 0. 80 0.20 - 0.90 THOU/mcL CHEMISTRY HEMATOLOGY     ABS EOS 0.00 (L) 0.03 - 0.45 THOU/mcL CHEMISTRY HEMATOLOGY     ABS BASOS 0.00 0.00 - 0.20 THOU/mcL CHEMISTRY HEMATOLOGY     SEGS 83 % CHEMISTRY HEMATOLOGY     LYMPHOCYTES 11 % CHEMISTRY HEMATOLOGY     MONOCYTES 6 % CHEMISTRY HEMATOLOGY     EOSINOPHIL 0 % CHEMISTRY HEMATOLOGY     BASOPHILS 0Comment: Tested at [employer] [address] % CHEMISTRY HEMATOLOGY       CBC with Differential (09/10/2020 11:54 AM EDT)   Specimen   Whole blood     CBC with Differential (09/10/2020 11:54 AM EDT)   Performing Organization Address City/State/Zipcode Phone Number   Ernie Pulliam, [address] [facility]  [phone]     CHEMISTRY HEMATOLOGY            Back to top of Lab Results       TODDKATHRYN Aditya Murdock) (09/10/2020 11:54 AM EDT)  Anika Alfred Aditya Ores) (09/10/2020 11:54 AM EDT)   Component Value Ref Range Performed At Pathologist Signature   BLD UREA NITROGEN 8 8 - 26 mg/dL CHEMISTRY HEMATOLOGY     SODIUM 138 135 - 145 mEq/L CHEMISTRY HEMATOLOGY     POTASSIUM 3.6 3.6 - 5.1 mEq/L CHEMISTRY HEMATOLOGY     CHLORIDE 106 98 - 111 mEq/L CHEMISTRY HEMATOLOGY     CO2 25 21 - 31 mmol/L CHEMISTRY HEMATOLOGY     GLUCOSE, RANDOM 80 70 - 99 mg/dL CHEMISTRY HEMATOLOGY     CREATININE 0.85 0.60 - 1.20 mg/dL CHEMISTRY HEMATOLOGY     ANION GAP 7 6 - 18 mmol/L CHEMISTRY HEMATOLOGY     CALCIUM 8.6 8.5 - 10.4 mg/dL CHEMISTRY HEMATOLOGY     GFR NON- AMER 90 >59 mL/min/1.73 m2 CHEMISTRY HEMATOLOGY     GFR  104  Comment: This estimated GFR was calculated using the CKD-EPI equation which is modified based on ethnicity for Non  Americans and  Americans. Both results are reported since it is not always possible to determine the patient's ethnicity from the   requisition. This equation should only be used for individuals 18 and older.   It has not been validated for use with the elderly (>70 years), pregnant women, or in some racial or ethnic subgroups, such as Hispanics. The equation will be less accurate in people with differences in nutritional status or muscle mass. Tested at [address] [address]   >59 mL/min/1.73 m2 CHEMISTRY HEMATOLOGY       BAMP (Na,K,Cl,CO2,Glu,BUN,Creat,Ca) (09/10/2020 11:54 AM EDT)   Specimen   Whole blood     BAMP (Na,K,Cl,CO2,Glu,BUN,Creat,Ca) (09/10/2020 11:54 AM EDT)   Performing Organization Address City/[facility] Phone Number   [place], [address], [address] [facility]  [phone]     CHEMISTRY HEMATOLOGY            Back to top of Lab Results       POCT GONADOT HCG QUALITATIVE (09/10/2020 11:30 AM EDT)  POCT GONADOT HCG QUALITATIVE (09/10/2020 11:30 AM EDT)   Component Value Ref Range Performed At Pathologist Signature   [address]       POCT GONADOT HCG QUALITATIVE (09/10/2020 11:30 AM EDT)   Specimen         POCT GONADOT HCG QUALITATIVE (09/10/2020 11:30 AM EDT)   Performing Organization Address City/[facility] Phone Number   [place]  [address] [facility], [address], [address] [facility]  [phone]     CENTRAL RECEIVING            Back to top of Lab Results       Urinalysis with Reflex to Culture (09/10/2020 11:22 AM EDT)  Urinalysis with Reflex to Culture (09/10/2020 11:22 AM EDT)   Component Value Ref Range Performed At Pathologist Signature   UA SPECIMEN SOURCE Urine, Clean Catch   STAT LABEL PROCESSING     URINE TYPE Urine   STAT LABEL PROCESSING     COLOR Yellow Yellow CHEMISTRY HEMATOLOGY     APPEARANCE, URINE TURBID (A) Clear CHEMISTRY HEMATOLOGY     PH, URINE 7.0 5.0 - 8.0 CHEMISTRY HEMATOLOGY     SPEC.  GRAVITY, URINE 1.022 1.005 - 1.029 CHEMISTRY HEMATOLOGY     PROTEIN, URINE 1+ (30-70 mg/dL) (A) NEGATIVE CHEMISTRY HEMATOLOGY     GLUCOSE, URINE NEGATIVE NEGATIVE CHEMISTRY HEMATOLOGY     KETONE, URINE 1+ (10-20 mg/dL) (A) NEGATIVE CHEMISTRY HEMATOLOGY   Consistent with valid sample         Greater than 2 but less than 20:  Possible dilution         Less than 2: Questionable valid sample         Drug           Cutoff Concentration  PCP                   25 ng/mL  Benzodiazepines       200 ng/mL  Cocaine               150 ng/mL  Amphetamines          500 ng/m  Tetrahydrocannabinol  50 ng/mL  Opiates               300 ng/mL  Barbiturates          200 ng/mL  Methadone             300 ng/mL  Oxycodone             100 ng/mL      Results are for MEDICAL purposes only.  Urine Drug Screen results are UNCONFIRMED screened results.  A more specific alternate chemical method must be used in order to obtain a confirmed analytical result.  Clinical consideration and professional   judgment should be applied to any drug of abuse test result, particularly when preliminary positive results are obtained.   Tested at [employer] [address] mg/dL         Care management risk score Rising risk (score 2-5) and Complex Care (Scores >=6): 1     Non face to face  following discharge, date last encounter closed (first attempt may have been earlier): *No documented post hospital discharge outreach found in the last 14 days *No documented post hospital discharge outreach found in the last 14 days    Call initiated 2 business days of discharge: *No response recorded in the last 14 days     Patient Active Problem List   Diagnosis    Chronic right-sided thoracic back pain    Alopecia    Low back pain    Scoliosis (and kyphoscoliosis), idiopathic    Depression       No Known Allergies    Medications listed as ordered at the time of discharge from [facility] Medication Instructions KARINE:    Printed on:09/14/20 8164   Medication Information                      vitamin D (ERGOCALCIFEROL) 1.25 MG (36675 UT) CAPS capsule  Take 1 capsule by mouth once a week                   Medications marked \"taking\" at this time  Outpatient Medications Marked as Taking for the 9/14/20 encounter (Office Visit) with Davin Ramos MD   Medication Sig Dispense Refill    vitamin D (ERGOCALCIFEROL) 1.25 MG (32108 UT) CAPS capsule Take 1 capsule by mouth once a week 12 capsule 1        Medications patient taking as of now reconciled against medications ordered at time of hospital discharge: Yes    Chief Complaint   Patient presents with   [address] from Hospital       HPI patient went to the emergency room when she was transported from her job after having what she calls was a mental net meltdown. Patient states she has bilateral depression for years. She was prescribed antidepressants but did not start them in the past. Patient works in a day care and she remembers sitting down on the floor and started crying uncontrollably. She felt depressed without any clear trigger and felt like giving up. Patient feels that her thoughts race all the time and she is not able to sleep because of this. Prior to going to the emergency room she would get her child ready for school and afterwards get back in the bed all day and not eating. Since emergency room visit she and her child are staying with her grandmother who was very supportive to her. No suicidal ideation at this time. Inpatient course: Discharge summary reviewed- see chart. Interval history/Current status: unchanged. Review of Systems   Constitutional: Negative. Negative for activity change, appetite change, chills, diaphoresis, fatigue and fever. HENT: Negative for dental problem, ear pain, hearing loss, mouth sores, nosebleeds, postnasal drip, rhinorrhea, sore throat, trouble swallowing and voice change. Eyes: Negative for photophobia, pain, discharge, redness, itching and visual disturbance. Respiratory: Negative for apnea, cough, choking, chest tightness, shortness of breath, wheezing and stridor. Cardiovascular: Negative for chest pain, palpitations and leg swelling.    Gastrointestinal: Negative for to Psychiatry   3. Pyuria noted on previous urinalysis not symptomatic will check urine culture  Culture, Urine    CBC Auto Differential    Urinalysis   4. Vitamin D deficiency start supplement monitor level.   Vitamin D 25 Hydroxy         Medical Decision Making: moderate complexity

## 2020-09-14 NOTE — LETTER
[facility]  [address] [address]  Phone: [phone]  Fax: [phone]    Griselda Chapman, MD        September 14, 2020     Patient: Kaden Collado   YOB: 1990   Date of Visit: 9/14/2020       To Whom It May Concern: It is my medical opinion that Kaden Collado  From 9/11/20 to 9/28/20 due medical illness. .    If you have any questions or concerns, please don't hesitate to call.     Sincerely,          Griselda Chapman, MD

## 2020-09-19 ASSESSMENT — ENCOUNTER SYMPTOMS
SHORTNESS OF BREATH: 0
RHINORRHEA: 0
DIARRHEA: 0
VOMITING: 0
RECTAL PAIN: 0
COUGH: 0
VOICE CHANGE: 0
APNEA: 0
CHEST TIGHTNESS: 0
EYE REDNESS: 0
CONSTIPATION: 0
EYE PAIN: 0
EYE DISCHARGE: 0
STRIDOR: 0
BACK PAIN: 0
TROUBLE SWALLOWING: 0
WHEEZING: 0
CHOKING: 0
BLOOD IN STOOL: 0
ABDOMINAL DISTENTION: 0
ABDOMINAL PAIN: 0
COLOR CHANGE: 0
EYE ITCHING: 0
SORE THROAT: 0
ANAL BLEEDING: 0
NAUSEA: 0
PHOTOPHOBIA: 0

## 2020-09-21 ENCOUNTER — TELEPHONE (OUTPATIENT)
Dept: PRIMARY CARE CLINIC | Age: 30
End: 2020-09-21

## 2020-09-21 NOTE — TELEPHONE ENCOUNTER
Pt dropped off [place] paperwork. She is Dr. Darinel Phillips patient, but saw Dr. Rena Plasencia on 9/14. Fax to [phone]. Danyel Oliver was given the paperwork. Clean is scanned in.

## 2020-09-29 NOTE — TELEPHONE ENCOUNTER
Patient calling to check the status of her [facility] paperwork. Paperwork was dropped off on the 21st and patient says her work place has still not received the completed paperwork.

## 2020-11-25 ENCOUNTER — HOSPITAL ENCOUNTER (EMERGENCY)
Age: 30
Discharge: HOME | End: 2020-11-25
Attending: EMERGENCY MEDICINE
Payer: COMMERCIAL

## 2020-11-25 VITALS
BODY MASS INDEX: 21.92 KG/M2 | RESPIRATION RATE: 16 BRPM | WEIGHT: 161.82 LBS | TEMPERATURE: 98.2 F | OXYGEN SATURATION: 100 % | DIASTOLIC BLOOD PRESSURE: 66 MMHG | HEIGHT: 72 IN | SYSTOLIC BLOOD PRESSURE: 123 MMHG | HEART RATE: 77 BPM

## 2020-11-25 LAB
BACTERIA WET PREP: NORMAL
BILIRUBIN URINE: NEGATIVE
BLOOD, URINE: NEGATIVE
CLARITY: CLEAR
CLUE CELLS: NORMAL
COLOR: YELLOW
EPITHELIAL CELLS WET PREP: NORMAL
GLUCOSE URINE: NEGATIVE MG/DL
HCG(URINE) PREGNANCY TEST: NEGATIVE
KETONES, URINE: NEGATIVE MG/DL
LEUKOCYTE ESTERASE, URINE: NEGATIVE
MICROSCOPIC EXAMINATION: NORMAL
NITRITE, URINE: NEGATIVE
PH UA: 7.5 (ref 5–8)
PROTEIN UA: NEGATIVE MG/DL
RBC WET PREP: NORMAL
SOURCE WET PREP: NORMAL
SPECIFIC GRAVITY UA: 1.02 (ref 1–1.03)
TRICHOMONAS PREP: NORMAL
URINE REFLEX TO CULTURE: NORMAL
URINE TYPE: NORMAL
UROBILINOGEN, URINE: 0.2 E.U./DL
WBC WET PREP: NORMAL
YEAST WET PREP: NORMAL

## 2020-11-25 PROCEDURE — 96372 THER/PROPH/DIAG INJ SC/IM: CPT

## 2020-11-25 PROCEDURE — 87210 SMEAR WET MOUNT SALINE/INK: CPT

## 2020-11-25 PROCEDURE — 99283 EMERGENCY DEPT VISIT LOW MDM: CPT

## 2020-11-25 PROCEDURE — 6370000000 HC RX 637 (ALT 250 FOR IP): Performed by: EMERGENCY MEDICINE

## 2020-11-25 PROCEDURE — 87491 CHLMYD TRACH DNA AMP PROBE: CPT

## 2020-11-25 PROCEDURE — 6360000002 HC RX W HCPCS: Performed by: EMERGENCY MEDICINE

## 2020-11-25 PROCEDURE — 81003 URINALYSIS AUTO W/O SCOPE: CPT

## 2020-11-25 PROCEDURE — 87591 N.GONORRHOEAE DNA AMP PROB: CPT

## 2020-11-25 PROCEDURE — 84703 CHORIONIC GONADOTROPIN ASSAY: CPT

## 2020-11-25 RX ORDER — AZITHROMYCIN 500 MG/1
1000 TABLET, FILM COATED ORAL ONCE
Status: COMPLETED | OUTPATIENT
Start: 2020-11-25 | End: 2020-11-25

## 2020-11-25 RX ORDER — CEFTRIAXONE SODIUM 250 MG/1
250 INJECTION, POWDER, FOR SOLUTION INTRAMUSCULAR; INTRAVENOUS ONCE
Status: COMPLETED | OUTPATIENT
Start: 2020-11-25 | End: 2020-11-25

## 2020-11-25 RX ORDER — METRONIDAZOLE 7.5 MG/G
1 GEL VAGINAL 2 TIMES DAILY
Qty: 1 TUBE | Refills: 0 | Status: SHIPPED | OUTPATIENT
Start: 2020-11-25 | End: 2020-11-30

## 2020-11-25 RX ORDER — NITROFURANTOIN 25; 75 MG/1; MG/1
100 CAPSULE ORAL EVERY 12 HOURS
COMMUNITY
Start: 2020-09-19 | End: 2021-12-28

## 2020-11-25 RX ADMIN — AZITHROMYCIN MONOHYDRATE 1000 MG: 500 TABLET ORAL at 22:56

## 2020-11-25 RX ADMIN — CEFTRIAXONE SODIUM 250 MG: 250 INJECTION, POWDER, FOR SOLUTION INTRAMUSCULAR; INTRAVENOUS at 22:56

## 2020-11-26 NOTE — ED PROVIDER NOTES
CHIEF COMPLAINT  Chief Complaint   Patient presents with    Vaginal Discharge     Pt has 2 to 3 day hx of green vaginal discharge. Pt states her boyfriend had sex with someone else. Pt recently tx'd for UTI - pt states UTI sxs resolved and denies abdominal pain. HISTORY OF PRESENT ILLNESS  Jodi Kapoor is a 26 y.o. female who presents to the ED complaining of a 3-day history of a green foul-smelling vaginal discharge without vaginal pain or dyspareunia. No vaginal bleeding. No dysuria or hematuria and patient just finished a course of Macrobid for a UTI. No back pain. No fevers or chills. No rash. Patient does have a current sexual partner who she believes has had other sexual partners recently. No other complaints, modifying factors or associated symptoms. Nursing notes reviewed. Past Medical History:   Diagnosis Date    Chronic right-sided thoracic back pain 6/7/2016    Depression 12/20/2018     History reviewed. No pertinent surgical history.   Family History   Problem Relation Age of Onset    High Blood Pressure Mother      Social History     Socioeconomic History    Marital status: Single     Spouse name: Not on file    Number of children: Not on file    Years of education: Not on file    Highest education level: Not on file   Occupational History    Not on file   Social Needs    Financial resource strain: Not on file    Food insecurity     Worry: Not on file     Inability: Not on file    Transportation needs     Medical: Not on file     Non-medical: Not on file   Tobacco Use    Smoking status: Never Smoker    Smokeless tobacco: Never Used   Substance and Sexual Activity    Alcohol use: Yes     Comment: socially    Drug use: No    Sexual activity: Yes     Partners: Male   Lifestyle    Physical activity     Days per week: Not on file     Minutes per session: Not on file    Stress: Not on file   Relationships    Social connections     Talks on phone: Not on file     Gets together: Not on file     Attends Jewish service: Not on file     Active member of club or organization: Not on file     Attends meetings of clubs or organizations: Not on file     Relationship status: Not on file    Intimate partner violence     Fear of current or ex partner: Not on file     Emotionally abused: Not on file     Physically abused: Not on file     Forced sexual activity: Not on file   Other Topics Concern    Not on file   Social History Narrative    Not on file     Current Facility-Administered Medications   Medication Dose Route Frequency Provider Last Rate Last Dose    cefTRIAXone (ROCEPHIN) injection 250 mg  250 mg Intramuscular Once Sharon Rasheed MD        azithromycin [facility]) tablet 1,000 mg  1,000 mg Oral Once Sharon Rasheed MD         Current Outpatient Medications   Medication Sig Dispense Refill    nitrofurantoin, macrocrystal-monohydrate, (MACROBID) 100 MG capsule Take 100 mg by mouth every 12 hours      metroNIDAZOLE (METROGEL) 0.75 % vaginal gel Place 1 Applicatorful vaginally 2 times daily for 5 days 1 Tube 0    divalproex (DEPAKOTE) 250 MG DR tablet Take 1 tablet by mouth 2 times daily 60 tablet 3    omeprazole (PRILOSEC) 20 MG delayed release capsule Take 1 capsule by mouth every morning (before breakfast) 30 capsule 5    vitamin D (ERGOCALCIFEROL) 1.25 MG (07813 UT) CAPS capsule Take 1 capsule by mouth once a week 12 capsule 1     No Known Allergies    REVIEW OF SYSTEMS  Positives and pertinent negatives as per HPI. Six other systems were reviewed and are negative. Nursing notes pertaining to ROS were reviewed. PHYSICAL EXAM   /66   Pulse 77   Temp 98.2 °F (36.8 °C) (Oral)   Resp 16   Ht 6' (1.829 m)   Wt 161 lb 13.1 oz (73.4 kg)   LMP 11/01/2020 (Approximate)   SpO2 100%   BMI 21.95 kg/m²   GENERAL APPEARANCE: Awake and alert. Cooperative. No acute distress. HEAD: Normocephalic. Atraumatic. EYES: PERRL. EOM's grossly intact.  No scleral icterus, injection or exudate  ENT: Mucous membranes are moist.  NECK: Supple. Normal ROM. CHEST:  Regular rate and rhythm, no murmurs, rubs or gallops  LUNGS: Breathing is unlabored. Speaking comfortably in full sentences. Clear through auscultation bilaterally  ABDOMEN: Nondistended, nontender  EXTREMITIES: MAEE. No acute deformities. SKIN: Warm and dry. NEUROLOGICAL: Alert and oriented. LABS  I have reviewed all labs for this visit. Results for orders placed or performed during the hospital encounter of 11/25/20   Wet Prep, Genital    Specimen: Vaginal   Result Value Ref Range    Trichomonas Prep None Seen     Yeast, Wet Prep None Seen     Clue Cells, Wet Prep None Seen     WBC, Wet Prep 2+     RBC, Wet Prep <1+     Epi Cells 2+     Bacteria 2+     Source Wet Prep Vaginal    Pregnancy, Urine   Result Value Ref Range    HCG(Urine) Pregnancy Test Negative Detects HCG level >20 MIU/mL   Urinalysis Reflex to Culture    Specimen: Urine, clean catch   Result Value Ref Range    Color, UA Yellow Straw/Yellow    Clarity, UA Clear Clear    Glucose, Ur Negative Negative mg/dL    Bilirubin Urine Negative Negative    Ketones, Urine Negative Negative mg/dL    Specific Gravity, UA 1.020 1.005 - 1.030    Blood, Urine Negative Negative    pH, UA 7.5 5.0 - 8.0    Protein, UA Negative Negative mg/dL    Urobilinogen, Urine 0.2 <2.0 E.U./dL    Nitrite, Urine Negative Negative    Leukocyte Esterase, Urine Negative Negative    Microscopic Examination Not Indicated     Urine Type Cleancatch     Urine Reflex to Culture Not Indicated            ED COURSE/MDM  Vaginitis/STD screening: Patient was treated empirically for gonorrhea and chlamydia in the emergency department with Rocephin and Zithromax. Patient does not have evidence of clue cells but was requesting empiric treatment for bacterial vaginosis and this request was deemed reasonable and she will be given a 5-day course of MetroGel.     Pt advised this is not full STD screening or PAP smear and that further testing including testing for HIV, HPV, Syphilis and Hepatitis will need to be completed at the health department or with a PCP. Pt advised to follow up with GYN for further testing, pap smear and follow up care. Pt advised to follow up in 2 days for  culture results. Advised not to have sex until patient knows  culture results are negative and they have been further advised. Patient was given scripts for the following medications. I counseled patient how to take these medications. New Prescriptions    METRONIDAZOLE (METROGEL) 0.75 % VAGINAL GEL    Place 1 Applicatorful vaginally 2 times daily for 5 days         CLINICAL IMPRESSION  1. Vaginitis and vulvovaginitis        Blood pressure 123/66, pulse 77, temperature 98.2 °F (36.8 °C), temperature source Oral, resp. rate 16, height 6' (1.829 m), weight 161 lb 13.1 oz (73.4 kg), last menstrual period 11/01/2020, SpO2 100 %, unknown if currently breastfeeding.       Follow-up with:  Teto Batista MD  [address] [facility]  [phone]    In 1 week  If symptoms worsen          Min Bowman MD  11/25/20 9709

## 2020-11-30 LAB
C TRACH DNA GENITAL QL NAA+PROBE: NEGATIVE
N. GONORRHOEAE DNA: POSITIVE

## 2020-11-30 NOTE — RESULT ENCOUNTER NOTE
Gonorrhea culture positive. Patient treated appropriately in the emergency department. No further treatment needed.

## 2021-05-24 ENCOUNTER — HOSPITAL ENCOUNTER (EMERGENCY)
Age: 31
Discharge: HOME | End: 2021-05-24
Attending: EMERGENCY MEDICINE
Payer: COMMERCIAL

## 2021-05-24 VITALS
SYSTOLIC BLOOD PRESSURE: 111 MMHG | RESPIRATION RATE: 14 BRPM | BODY MASS INDEX: 22.25 KG/M2 | DIASTOLIC BLOOD PRESSURE: 63 MMHG | HEART RATE: 79 BPM | TEMPERATURE: 98 F | WEIGHT: 164.24 LBS | HEIGHT: 72 IN | OXYGEN SATURATION: 100 %

## 2021-05-24 DIAGNOSIS — N76.0 VAGINITIS AND VULVOVAGINITIS: Primary | ICD-10-CM

## 2021-05-24 LAB
BACTERIA WET PREP: NORMAL
BACTERIA: ABNORMAL /HPF
BILIRUBIN URINE: NEGATIVE
BLOOD, URINE: ABNORMAL
CLARITY: CLEAR
CLUE CELLS: NORMAL
COLOR: YELLOW
EPITHELIAL CELLS WET PREP: NORMAL
EPITHELIAL CELLS, UA: ABNORMAL /HPF (ref 0–5)
GLUCOSE URINE: NEGATIVE MG/DL
HCG(URINE) PREGNANCY TEST: NEGATIVE
KETONES, URINE: NEGATIVE MG/DL
LEUKOCYTE ESTERASE, URINE: NEGATIVE
MICROSCOPIC EXAMINATION: YES
NITRITE, URINE: NEGATIVE
PH UA: 6 (ref 5–8)
PROTEIN UA: NEGATIVE MG/DL
RBC UA: ABNORMAL /HPF (ref 0–4)
RBC WET PREP: NORMAL
SOURCE WET PREP: NORMAL
SPECIFIC GRAVITY UA: 1.02 (ref 1–1.03)
TRICHOMONAS PREP: NORMAL
URINE REFLEX TO CULTURE: ABNORMAL
URINE TYPE: ABNORMAL
UROBILINOGEN, URINE: 0.2 E.U./DL
WBC UA: ABNORMAL /HPF (ref 0–5)
WBC WET PREP: NORMAL
YEAST WET PREP: NORMAL

## 2021-05-24 PROCEDURE — 99283 EMERGENCY DEPT VISIT LOW MDM: CPT

## 2021-05-24 PROCEDURE — 6360000002 HC RX W HCPCS: Performed by: EMERGENCY MEDICINE

## 2021-05-24 PROCEDURE — 87491 CHLMYD TRACH DNA AMP PROBE: CPT

## 2021-05-24 PROCEDURE — 6370000000 HC RX 637 (ALT 250 FOR IP): Performed by: EMERGENCY MEDICINE

## 2021-05-24 PROCEDURE — 87591 N.GONORRHOEAE DNA AMP PROB: CPT

## 2021-05-24 PROCEDURE — 84703 CHORIONIC GONADOTROPIN ASSAY: CPT

## 2021-05-24 PROCEDURE — 81001 URINALYSIS AUTO W/SCOPE: CPT

## 2021-05-24 PROCEDURE — 87210 SMEAR WET MOUNT SALINE/INK: CPT

## 2021-05-24 PROCEDURE — 96372 THER/PROPH/DIAG INJ SC/IM: CPT

## 2021-05-24 RX ORDER — AZITHROMYCIN 500 MG/1
1000 TABLET, FILM COATED ORAL ONCE
Status: COMPLETED | OUTPATIENT
Start: 2021-05-24 | End: 2021-05-24

## 2021-05-24 RX ORDER — CEFTRIAXONE 500 MG/1
500 INJECTION, POWDER, FOR SOLUTION INTRAMUSCULAR; INTRAVENOUS ONCE
Status: COMPLETED | OUTPATIENT
Start: 2021-05-24 | End: 2021-05-24

## 2021-05-24 RX ADMIN — AZITHROMYCIN MONOHYDRATE 1000 MG: 500 TABLET ORAL at 11:11

## 2021-05-24 RX ADMIN — CEFTRIAXONE SODIUM 500 MG: 500 INJECTION, POWDER, FOR SOLUTION INTRAMUSCULAR; INTRAVENOUS at 11:11

## 2021-05-24 NOTE — ED PROVIDER NOTES
eMERGENCY dEPARTMENT eNCOUnter      Pt Name: Cullen Amaya  MRN: [id number]  [place] 1990  Date of evaluation: 5/24/2021  Provider: Isabel Brantley MD     [address] [place] [address]       Chief Complaint   Patient presents with    Vaginal Discharge     c/o yellow vaginal discharge x 2 days. No c/o pain         HISTORY OF PRESENT ILLNESS   (Location/Symptom, Timing/Onset,Context/Setting, Quality, Duration, Modifying Factors, Severity) Note limiting factors. HPI    Cullen Amaya is a 26 y.o. female who presents to the emergency department with a vaginal discharge for couple days. Patient has prior history of STD. Patient is sexually active. Patient denies any abdominal pain. There has been no fevers no chills. Patient appears to be in no distress. Nursing Notes were reviewed. REVIEW OFSYSTEMS    (2+ for level 4; 10+ for level 5)   Review of Systems    General: No fevers, chills or night sweats, No weight loss    Head:  No Sore throat,  No Ear Pain    Chest:  Nontender. No Cough, No SOB,  Chest Pain    GI: No abdominal pain or vomiting    : No dysuria or hematuria. Positive discharge. Musculoskeletal: No unrelenting pain or night pain    Neurologic: No bowel or bladder incontinence, No saddle anesthesia, No leg weakness    All other systems reviewed and are negative. PAST MEDICAL HISTORY     Past Medical History:   Diagnosis Date    Chronic right-sided thoracic back pain 6/7/2016    Depression 12/20/2018       SURGICAL HISTORY     History reviewed. No pertinent surgical history.     CURRENT MEDICATIONS       Previous Medications    DIVALPROEX (DEPAKOTE) 250 MG DR TABLET    Take 1 tablet by mouth 2 times daily    NITROFURANTOIN, MACROCRYSTAL-MONOHYDRATE, (MACROBID) 100 MG CAPSULE    Take 100 mg by mouth every 12 hours    OMEPRAZOLE (PRILOSEC) 20 MG DELAYED RELEASE CAPSULE    Take 1 capsule by mouth every morning (before breakfast)    VITAMIN D (ERGOCALCIFEROL) 1.25 MG (87184 UT) CAPS CAPSULE    Take 1 capsule by mouth once a week       ALLERGIES     Patient has no known allergies. FAMILY HISTORY       Family History   Problem Relation Age of Onset    High Blood Pressure Mother         SOCIAL HISTORY       Social History     Socioeconomic History    Marital status: Single     Spouse name: None    Number of children: None    Years of education: None    Highest education level: None   Occupational History    None   Tobacco Use    Smoking status: Never Smoker    Smokeless tobacco: Never Used   Substance and Sexual Activity    Alcohol use: Yes     Comment: socially    Drug use: No    Sexual activity: Yes     Partners: Male   Other Topics Concern    None   Social History Narrative    None     Social Determinants of Health     Financial Resource Strain:     Difficulty of Paying Living Expenses:    Food Insecurity:     Worried About Running Out of Food in the Last Year:     Ran Out of Food in the Last Year:    Transportation Needs:     Lack of Transportation (Medical):  Lack of Transportation (Non-Medical):    Physical Activity:     Days of Exercise per Week:     Minutes of Exercise per Session:    Stress:     Feeling of Stress :    Social Connections:     Frequency of Communication with Friends and Family:     Frequency of Social Gatherings with Friends and Family:     Attends Scientologist Services:     Active Member of Clubs or Organizations:     Attends Club or Organization Meetings:     Marital Status:    Intimate Partner Violence:     Fear of Current or Ex-Partner:     Emotionally Abused:     Physically Abused:     Sexually Abused:        SCREENINGS           PHYSICAL EXAM    (up to 7 for level 4, 8 or more for level 5)     ED Triage Vitals [05/24/21 1012]   BP Temp Temp Source Pulse Resp SpO2 Height Weight   111/63 98 °F (36.7 °C) Skin 79 14 100 % 6' (1.829 m) 164 lb 3.9 oz (74.5 kg)       Physical Exam    General: Alert and awake ×3. Nontoxic appearance.   Well-developed well-nourished 68-year-old black female in no acute distress  HEENT: Normocephalic atraumatic. Neck is supple. Airway intact. No adenopathy  Cardiac: Regular rate and rhythm with no murmurs rubs or gallops  Pulmonary: Lungs are clear in all lung fields. No wheezing. No Rales. Abdomen: Soft and nontender. Negative hepatosplenomegaly. Bowel sounds are active  Extremities: Moving all extremities. No calf tenderness. Peripheral pulses all intact  Skin: No skin lesions. No rashes  Neurologic: Cranial nerves II through XII was grossly intact. Nonfocal neurological exam  Psychiatric: Patient is pleasant. Mood is appropriate. DIAGNOSTIC RESULTS     EKG (Per Emergency Physician):       RADIOLOGY (Per Emergency Physician): Interpretation per the Radiologist below, if available at the time of this note:  No results found.     ED BEDSIDE ULTRASOUND:   Performed by ED Physician - none    LABS:  Labs Reviewed   URINE RT REFLEX TO CULTURE - Abnormal; Notable for the following components:       Result Value    Blood, Urine SMALL (*)     All other components within normal limits    Narrative:     Performed at:  [facility]  [address] [place], [facility]   Phone [phone]   MICROSCOPIC URINALYSIS - Abnormal; Notable for the following components:    Bacteria, UA Rare (*)     All other components within normal limits    Narrative:     Performed at:  [facility]  [address] [place], [facility]   Phone [phone]   WET PREP, GENITAL    Narrative:     Performed at:  [facility]  [address] [place], [facility]   Phone [phone] DNA   PREGNANCY, URINE    Narrative:     Performed at:  [facility]  [address] [place], [facility]   Phone [phone]        All other labs were within normal range or not returned as of this dictation. Procedures      EMERGENCY DEPARTMENT COURSE and DIFFERENTIAL DIAGNOSIS/MDM:   Vitals:    Vitals:    05/24/21 1012   BP: 111/63   Pulse: 79   Resp: 14   Temp: 98 °F (36.7 °C)   TempSrc: Skin   SpO2: 100%   Weight: 164 lb 3.9 oz (74.5 kg)   Height: 6' (1.829 m)       Medications   cefTRIAXone (ROCEPHIN) injection 500 mg (500 mg Intramuscular Given 5/24/21 1111)   azithromycin (ZITHROMAX) tablet 1,000 mg (1,000 mg Oral Given 5/24/21 1111)       MDM. Patient is a 73-year-old no distress presents with vaginal discharge. Patient has no pain. Exam is unremarkable cultures pending at this time. Wet prep is negative. Patient elected to be treated. Patient given Rocephin and Zithromax. Discharge in good condition follow-up as needed      REVAL:         CRITICAL CARE TIME   Total CriticalCare time was 0 minutes, excluding separately reportable procedures. There was a high probability of clinically significant/life threatening deterioration in the patient's condition which required my urgent intervention. CONSULTS:  None    PROCEDURES:  Unless otherwise noted below, none     [unfilled]    FINAL IMPRESSION      1. Vaginitis and vulvovaginitis          DISPOSITION/PLAN   DISPOSITION Decision To Discharge 05/24/2021 11:00:54 AM      PATIENT REFERRED TO:  Francis Todd, [address] [place] [phone]    Schedule an appointment as soon as possible for a visit in 1 week  If symptoms worsen      DISCHARGE MEDICATIONS:  New Prescriptions    No medications on file          (Please note:  Portions of this note were completed with a voice recognition program.Efforts were made to edit the dictations but occasionally words and phrases are mis-transcribed.)  Form v2016. J.5-cn    Flor MCDONNELL MD (electronically signed)  Emergency Medicine Provider        Tory Garcia MD  05/24/21 3262

## 2021-05-25 LAB
C TRACH DNA GENITAL QL NAA+PROBE: NEGATIVE
N. GONORRHOEAE DNA: NEGATIVE

## 2021-10-10 ENCOUNTER — HOSPITAL ENCOUNTER (EMERGENCY)
Age: 31
Discharge: HOME | End: 2021-10-10
Attending: EMERGENCY MEDICINE
Payer: COMMERCIAL

## 2021-10-10 VITALS
HEIGHT: 72 IN | SYSTOLIC BLOOD PRESSURE: 119 MMHG | DIASTOLIC BLOOD PRESSURE: 80 MMHG | TEMPERATURE: 98.3 F | HEART RATE: 68 BPM | BODY MASS INDEX: 22.19 KG/M2 | WEIGHT: 163.8 LBS | RESPIRATION RATE: 18 BRPM | OXYGEN SATURATION: 100 %

## 2021-10-10 DIAGNOSIS — M79.10 MYALGIA: Primary | ICD-10-CM

## 2021-10-10 DIAGNOSIS — R52 BODY ACHES: ICD-10-CM

## 2021-10-10 PROCEDURE — 99283 EMERGENCY DEPT VISIT LOW MDM: CPT

## 2021-10-10 PROCEDURE — 6370000000 HC RX 637 (ALT 250 FOR IP): Performed by: EMERGENCY MEDICINE

## 2021-10-10 RX ORDER — IBUPROFEN 400 MG/1
400 TABLET ORAL 4 TIMES DAILY PRN
Qty: 60 TABLET | Refills: 0 | Status: SHIPPED | OUTPATIENT
Start: 2021-10-10 | End: 2022-02-13 | Stop reason: ALTCHOICE

## 2021-10-10 RX ORDER — GUAIFENESIN 600 MG/1
600 TABLET, EXTENDED RELEASE ORAL 2 TIMES DAILY
Qty: 20 TABLET | Refills: 0 | Status: SHIPPED | OUTPATIENT
Start: 2021-10-10 | End: 2021-10-20

## 2021-10-10 RX ORDER — ACETAMINOPHEN 325 MG/1
650 TABLET ORAL EVERY 6 HOURS PRN
Qty: 60 TABLET | Refills: 0 | Status: SHIPPED | OUTPATIENT
Start: 2021-10-10 | End: 2022-02-13 | Stop reason: ALTCHOICE

## 2021-10-10 RX ORDER — ACETAMINOPHEN 325 MG/1
650 TABLET ORAL ONCE
Status: COMPLETED | OUTPATIENT
Start: 2021-10-10 | End: 2021-10-10

## 2021-10-10 RX ORDER — IBUPROFEN 400 MG/1
400 TABLET ORAL ONCE
Status: COMPLETED | OUTPATIENT
Start: 2021-10-10 | End: 2021-10-10

## 2021-10-10 RX ADMIN — ACETAMINOPHEN 650 MG: 325 TABLET ORAL at 20:43

## 2021-10-10 RX ADMIN — IBUPROFEN 400 MG: 400 TABLET, FILM COATED ORAL at 20:43

## 2021-10-10 ASSESSMENT — PAIN SCALES - GENERAL
PAINLEVEL_OUTOF10: 4
PAINLEVEL_OUTOF10: 4

## 2021-10-10 ASSESSMENT — PAIN DESCRIPTION - LOCATION: LOCATION: GENERALIZED

## 2021-10-10 ASSESSMENT — PAIN DESCRIPTION - DESCRIPTORS: DESCRIPTORS: ACHING

## 2021-10-10 ASSESSMENT — PAIN DESCRIPTION - PAIN TYPE: TYPE: ACUTE PAIN

## 2021-10-10 NOTE — Clinical Note
Bushra Reeder was seen and treated in our emergency department on 10/10/2021. She may return to work on 10/13/2021. If you have any questions or concerns, please don't hesitate to call.       Grace Angeles MD

## 2021-10-11 NOTE — ED TRIAGE NOTES
Patient presents to ED complaining of reports chills and body aches x3 days. Denies fever, denies cough, denies loss of taste or smell. Deneis N/V/D. Patient resting on bed, respirations even and easy at this time. No obvious distress.

## 2021-10-11 NOTE — ED PROVIDER NOTES
[address] [facility] ENCOUNTER      Pt Name: Rudolph Putnam  MRN: [id number]  [place] 1990  Date of evaluation: 10/10/2021  Provider: Izzy Sr MD    CHIEF COMPLAINT     I guess I was coming in to make myself feel better. I had been in bed the last three days, what I thought was a sinus infection ended up being me in bed with body aches and then having hot and cold. No fevers through none of it. I had a headache the first and half of the second day. I haven't been eating anything up until today I had chicken noodle soup for breakfast and then I had pizza for lunch. HISTORY OF PRESENT ILLNESS  (Location/Symptom, Timing/Onset,Context/Setting, Quality, Duration, Modifying Factors, Severity). Note limiting factors. Chief Complaint   Patient presents with    Nasal Congestion     x3 days, reports chills and body aches. Denies fever, denies cough, denies loss of taste or smell. Deneis N/V/D.    Sree Adamson Generalized Body Aches      Rudolph Putnam is a 33 y.o. female who presents to the emergency department secondary to concern for multiple symptoms as noted above. She states she has been drinking water but maybe not enough. She has not taken any medicine for her symptoms. Has been vaccinated for covid. 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 She was recently tested (less than a week ago) and was negative. She has not had her flu vaccine yet. She denies any abdominal pain, chest pain, changes in urination or BMs. She states mostly she just felt like crap. She has not had a cough. Past medical history noted below, significant for chronic right sided thoracic back pain and depression. Denies smoking. Aside from what is stated above denies any other symptoms or modifying factors. Nursing Notes reviewed.     REVIEW OF SYSTEMS  (2-9 systems for level 4, 10 or more for level 5)   Review of Systems  Pertinent positive and negative findings as documented in the HPI;   PAST MEDICAL HISTORY     Past Medical History:   Diagnosis Date    Chronic right-sided thoracic back pain 6/7/2016    Depression 12/20/2018       SURGICALHISTORY     History reviewed. No pertinent surgical history. CURRENT MEDICATIONS       Previous Medications    DIVALPROEX (DEPAKOTE) 250 MG DR TABLET    Take 1 tablet by mouth 2 times daily    NITROFURANTOIN, MACROCRYSTAL-MONOHYDRATE, (MACROBID) 100 MG CAPSULE    Take 100 mg by mouth every 12 hours    OMEPRAZOLE (PRILOSEC) 20 MG DELAYED RELEASE CAPSULE    Take 1 capsule by mouth every morning (before breakfast)    VITAMIN D (ERGOCALCIFEROL) 1.25 MG (86463 UT) CAPS CAPSULE    Take 1 capsule by mouth once a week      ALLERGIES     Patient has no known allergies. FAMILY HISTORY       Family History   Problem Relation Age of Onset    High Blood Pressure Mother      SOCIAL HISTORY       Social History     Socioeconomic History    Marital status: Single     Spouse name: None    Number of children: None    Years of education: None    Highest education level: None   Occupational History    None   Tobacco Use    Smoking status: Never Smoker    Smokeless tobacco: Never Used   Substance and Sexual Activity    Alcohol use: Yes     Comment: socially    Drug use: No    Sexual activity: Yes     Partners: Male   Other Topics Concern    None   Social History Narrative    None     Social Determinants of Health     Financial Resource Strain:     Difficulty of Paying Living Expenses:    Food Insecurity:     Worried About Running Out of Food in the Last Year:     Ran Out of Food in the Last Year:    Transportation Needs:     Lack of Transportation (Medical):      Lack of Transportation (Non-Medical):    Physical Activity:     Days of Exercise per Week:     Minutes of Exercise per Session:    Stress:     Feeling of Stress :    Social Connections:     Frequency of Communication with Friends and Family:     Frequency of Social Gatherings with Friends and Family:     Attends Adventist Services:     Active Member of Clubs or Organizations:     Attends Club or Organization Meetings:     Marital Status:    Intimate Partner Violence:     Fear of Current or Ex-Partner:     Emotionally Abused:     Physically Abused:     Sexually Abused:      SCREENINGS         PHYSICAL EXAM  (up to 7 for level 4, 8 or more for level 5)   INITIAL VITALS: BP: 128/76, Temp: 98.3 °F (36.8 °C), Pulse: 78, Resp: 18, SpO2: 100 %   Physical Exam  Vitals reviewed. Constitutional:       Appearance: She is not toxic-appearing or diaphoretic. HENT:      Head: Normocephalic and atraumatic. Right Ear: External ear normal.      Left Ear: External ear normal.   Eyes:      General: No scleral icterus. Right eye: No discharge. Left eye: No discharge. Conjunctiva/sclera: Conjunctivae normal.   Neck:      Trachea: No tracheal deviation. Cardiovascular:      Rate and Rhythm: Normal rate. Pulmonary:      Effort: Pulmonary effort is normal. No respiratory distress. Musculoskeletal:      Cervical back: Normal range of motion. No rigidity. Skin:     General: Skin is dry. Capillary Refill: Capillary refill takes less than 2 seconds. Neurological:      General: No focal deficit present. Mental Status: She is alert and oriented to person, place, and time.    Psychiatric:         Mood and Affect: Mood normal.         Behavior: Behavior normal.       DIAGNOSTIC RESULTS     RADIOLOGY:   Interpretation per Radiologist below, if available at the time of this note:  No orders to display     LABS:  Labs Reviewed - No data to display    [address] [place] [address] and DIFFERENTIAL DIAGNOSIS/MDM:   Patient was given the following medications:  Orders Placed This Encounter   Medications    ibuprofen (ADVIL;MOTRIN) tablet 400 mg    acetaminophen (TYLENOL) tablet 650 mg    ibuprofen (ADVIL;MOTRIN) 400 MG tablet     Sig: Take 1 tablet by mouth 4 times daily as needed for Pain or Fever     Dispense:  60 tablet     Refill:  0    acetaminophen (TYLENOL) 325 MG tablet     Sig: Take 2 tablets by mouth every 6 hours as needed for Pain or Fever     Dispense:  60 tablet     Refill:  0    guaiFENesin (MUCINEX) 600 MG extended release tablet     Sig: Take 1 tablet by mouth 2 times daily for 10 days     Dispense:  20 tablet     Refill:  0     CONSULTS:  None    INITIAL VITALS: BP: 128/76, Temp: 98.3 °F (36.8 °C), Pulse: 78, Resp: 18, SpO2: 100 %   RECENT VITALS:  BP: 128/84,Temp: 98.3 °F (36.8 °C), Pulse: 78, Resp: 18, SpO2: 97 %     Levi Primrose is a 33 y.o. female who presents to the emergency department secondary to concern for symptoms as noted in HPI. On arrival she is awake, alert, oriented with vitals that are HDS and wnl. Her physical exam is reassuring as noted above. We talked about natural progression of symptoms with viral illness and OTC medications for symptoms like tylenol and ibuprofen in addition to mucinex which were prescribed to her as well. Discussed follow up with primary care, getting flu shot, and she was discharged home in stable condition. FINAL IMPRESSION      1. Myalgia    2.  Body aches        DISPOSITION/PLAN   DISPOSITION        PATIENT REFERRED TO:  Kimberly Pierson MD  [address] [facility]  [phone]    Schedule an appointment as soon as possible for a visit in 1 week  For follow up appointment      DISCHARGE MEDICATIONS:  New Prescriptions    ACETAMINOPHEN (TYLENOL) 325 MG TABLET    Take 2 tablets by mouth every 6 hours as needed for Pain or Fever    GUAIFENESIN (MUCINEX) 600 MG EXTENDED RELEASE TABLET    Take 1 tablet by mouth 2 times daily for 10 days    IBUPROFEN (ADVIL;MOTRIN) 400 MG TABLET    Take 1 tablet by mouth 4 times daily as needed for Pain or Fever            (Please note that portions of this note were completed with a voice recognition program. Efforts were made to edit the dictations but occasionally words are mis-transcribed.)    Haydee Stallings MD (electronically signed)  Attending Emergency Physician      Haydee Stallings MD  10/10/21 2025

## 2021-10-11 NOTE — ED NOTES
Patient ambulatory from ED. AVS provided and discussed with patient. All questions answered. Patient verbalizes understanding of discharge instructions. Respirations even and easy. No obvious distress at this time.        Mo Schuster RN  10/10/21 2039

## 2021-11-06 ENCOUNTER — HOSPITAL ENCOUNTER (EMERGENCY)
Age: 31
Discharge: HOME | End: 2021-11-06
Attending: EMERGENCY MEDICINE
Payer: COMMERCIAL

## 2021-11-06 VITALS
DIASTOLIC BLOOD PRESSURE: 60 MMHG | BODY MASS INDEX: 21.98 KG/M2 | HEIGHT: 72 IN | HEART RATE: 79 BPM | RESPIRATION RATE: 16 BRPM | TEMPERATURE: 98.2 F | OXYGEN SATURATION: 100 % | WEIGHT: 162.26 LBS | SYSTOLIC BLOOD PRESSURE: 126 MMHG

## 2021-11-06 DIAGNOSIS — N76.0 BACTERIAL VAGINOSIS: Primary | ICD-10-CM

## 2021-11-06 DIAGNOSIS — B96.89 BACTERIAL VAGINOSIS: Primary | ICD-10-CM

## 2021-11-06 LAB
BACTERIA WET PREP: ABNORMAL
BACTERIA: ABNORMAL /HPF
BILIRUBIN URINE: NEGATIVE
BLOOD, URINE: ABNORMAL
CLARITY: ABNORMAL
CLUE CELLS: ABNORMAL
COLOR: YELLOW
EPITHELIAL CELLS WET PREP: ABNORMAL
EPITHELIAL CELLS, UA: ABNORMAL /HPF (ref 0–5)
GLUCOSE URINE: NEGATIVE MG/DL
HCG(URINE) PREGNANCY TEST: NEGATIVE
KETONES, URINE: ABNORMAL MG/DL
LEUKOCYTE ESTERASE, URINE: NEGATIVE
MICROSCOPIC EXAMINATION: YES
NITRITE, URINE: NEGATIVE
PH UA: 6 (ref 5–8)
PROTEIN UA: NEGATIVE MG/DL
RBC UA: ABNORMAL /HPF (ref 0–4)
RBC WET PREP: ABNORMAL
RENAL EPITHELIAL, UA: ABNORMAL /HPF (ref 0–1)
SOURCE WET PREP: ABNORMAL
SPECIFIC GRAVITY UA: 1.02 (ref 1–1.03)
TRICHOMONAS PREP: ABNORMAL
URINE REFLEX TO CULTURE: ABNORMAL
URINE TYPE: ABNORMAL
UROBILINOGEN, URINE: 0.2 E.U./DL
WBC UA: ABNORMAL /HPF (ref 0–5)
WBC WET PREP: ABNORMAL
YEAST WET PREP: ABNORMAL

## 2021-11-06 PROCEDURE — 87491 CHLMYD TRACH DNA AMP PROBE: CPT

## 2021-11-06 PROCEDURE — 84703 CHORIONIC GONADOTROPIN ASSAY: CPT

## 2021-11-06 PROCEDURE — 87591 N.GONORRHOEAE DNA AMP PROB: CPT

## 2021-11-06 PROCEDURE — 99282 EMERGENCY DEPT VISIT SF MDM: CPT

## 2021-11-06 PROCEDURE — 87210 SMEAR WET MOUNT SALINE/INK: CPT

## 2021-11-06 PROCEDURE — 81001 URINALYSIS AUTO W/SCOPE: CPT

## 2021-11-06 RX ORDER — METRONIDAZOLE 7.5 MG/G
GEL VAGINAL NIGHTLY
Qty: 70 G | Refills: 0 | Status: SHIPPED | OUTPATIENT
Start: 2021-11-06 | End: 2021-11-11

## 2021-11-06 RX ORDER — METRONIDAZOLE 500 MG/1
500 TABLET ORAL 2 TIMES DAILY
Qty: 14 TABLET | Refills: 0 | Status: SHIPPED
Start: 2021-11-06 | End: 2021-11-06 | Stop reason: SINTOL

## 2021-11-06 NOTE — ED PROVIDER NOTES
Triage Chief Complaint:   Vaginal Discharge (c/o vaginal discharge past 5 days  states was exposed to STD)      [place]:  Angi Alvarado is a 33 y.o. female that presents to the emergency department with vaginal discharge for the past 5 days. She states she was told recently by her significant other that he had intercourse with another person and he has since tested positive for chlamydia. Patient denies abdominal pain, pelvic pain, flank pain. .    Past Medical History:   Diagnosis Date    Chronic right-sided thoracic back pain 6/7/2016    Depression 12/20/2018     No past surgical history on file. Family History   Problem Relation Age of Onset    High Blood Pressure Mother      Social History     Socioeconomic History    Marital status: Single     Spouse name: Not on file    Number of children: Not on file    Years of education: Not on file    Highest education level: Not on file   Occupational History    Not on file   Tobacco Use    Smoking status: Never Smoker    Smokeless tobacco: Never Used   Substance and Sexual Activity    Alcohol use: Yes     Comment: socially    Drug use: No    Sexual activity: Yes     Partners: Male   Other Topics Concern    Not on file   Social History Narrative    Not on file     Social Determinants of Health     Financial Resource Strain:     Difficulty of Paying Living Expenses: Not on file   Food Insecurity:     Worried About Running Out of Food in the Last Year: Not on file    Jonathan of Food in the Last Year: Not on file   Transportation Needs:     Lack of Transportation (Medical): Not on file    Lack of Transportation (Non-Medical):  Not on file   Physical Activity:     Days of Exercise per Week: Not on file    Minutes of Exercise per Session: Not on file   Stress:     Feeling of Stress : Not on file   Social Connections:     Frequency of Communication with Friends and Family: Not on file    Frequency of Social Gatherings with Friends and Family: Not on file  Attends Cheondoism Services: Not on file    Active Member of Clubs or Organizations: Not on file    Attends Club or Organization Meetings: Not on file    Marital Status: Not on file   Intimate Partner Violence:     Fear of Current or Ex-Partner: Not on file    Emotionally Abused: Not on file    Physically Abused: Not on file    Sexually Abused: Not on file   Housing Stability:     Unable to Pay for Housing in the Last Year: Not on file    Number of [place] in the Last Year: Not on file    Unstable Housing in the Last Year: Not on file     No current facility-administered medications for this encounter. Current Outpatient Medications   Medication Sig Dispense Refill    metroNIDAZOLE (FLAGYL) 500 MG tablet Take 1 tablet by mouth 2 times daily for 7 days 14 tablet 0    ibuprofen (ADVIL;MOTRIN) 400 MG tablet Take 1 tablet by mouth 4 times daily as needed for Pain or Fever 60 tablet 0    acetaminophen (TYLENOL) 325 MG tablet Take 2 tablets by mouth every 6 hours as needed for Pain or Fever 60 tablet 0    nitrofurantoin, macrocrystal-monohydrate, (MACROBID) 100 MG capsule Take 100 mg by mouth every 12 hours      divalproex (DEPAKOTE) 250 MG DR tablet Take 1 tablet by mouth 2 times daily 60 tablet 3    omeprazole (PRILOSEC) 20 MG delayed release capsule Take 1 capsule by mouth every morning (before breakfast) 30 capsule 5    vitamin D (ERGOCALCIFEROL) 1.25 MG (40268 UT) CAPS capsule Take 1 capsule by mouth once a week 12 capsule 1     No Known Allergies  Nursing Notes Reviewed    ROS:  At least 10 systems reviewed and otherwise negative except as in the [place]. Physical Exam:  ED Triage Vitals [11/06/21 1014]   Enc Vitals Group      /60      Pulse 79      Resp 16      Temp 98.2 °F (36.8 °C)      Temp Source Oral      SpO2 100 %      Weight 162 lb 4.1 oz (73.6 kg)      Height 6' (1.829 m)      Head Circumference       Peak Flow       Pain Score       Pain Loc       Pain Edu? Excl. in [address]?      My pulse oximetry interpretation is which is within the normal range    GENERAL APPEARANCE: Awake and alert. Cooperative. No acute distress. HEAD:  Atraumatic. EYES: EOM's grossly intact. ENT: Mucous membranes are moist.  No trismus. NECK:  Trachea midline. ABDOMEN: Soft. Non-tender. No guarding or rebound. EXTREMITIES: No acute deformities. SKIN: Warm and dry.       I have reviewed and interpreted all of the currently available lab results from this visit (if applicable):  Results for orders placed or performed during the hospital encounter of 11/06/21   Wet prep, genital    Specimen: Vaginal   Result Value Ref Range    Trichomonas Prep None Seen     Yeast, Wet Prep None Seen     Clue Cells, Wet Prep 4+ (A)     WBC, Wet Prep <1+     RBC, Wet Prep <1+     Epi Cells 3+     Bacteria 4+     Source Wet Prep Vaginal    Pregnancy, Urine   Result Value Ref Range    HCG(Urine) Pregnancy Test Negative Detects HCG level >20 MIU/mL   Urinalysis Reflex to Culture    Specimen: Urine, clean catch   Result Value Ref Range    Color, UA Yellow Straw/Yellow    Clarity, UA SL CLOUDY (A) Clear    Glucose, Ur Negative Negative mg/dL    Bilirubin Urine Negative Negative    Ketones, Urine TRACE (A) Negative mg/dL    Specific Gravity, UA 1.025 1.005 - 1.030    Blood, Urine SMALL (A) Negative    pH, UA 6.0 5.0 - 8.0    Protein, UA Negative Negative mg/dL    Urobilinogen, Urine 0.2 <2.0 E.U./dL    Nitrite, Urine Negative Negative    Leukocyte Esterase, Urine Negative Negative    Microscopic Examination YES     Urine Type NotGiven     Urine Reflex to Culture Not Indicated    Microscopic Urinalysis   Result Value Ref Range    WBC, UA 6-9 (A) 0 - 5 /HPF    RBC, UA 0-2 0 - 4 /HPF    Epithelial Cells, UA 11-20 (A) 0 - 5 /HPF    Renal Epithelial, UA 0-1 0 - 1 /HPF    Bacteria, UA 2+ (A) None Seen /HPF          EKG: (All EKG's are interpreted by myself in the absence of a cardiologist)      MDM:  Patient's urinalysis shows small amount of blood. Her pregnancy test is negative. Her urinalysis shows 6-9 WBCs but does show epithelial cells as well. She is not having any dysuria. Wet prep shows 4+ clue cells. I will start her on Flagyl for bacterial vaginosis. We did send off gonorrhea chlamydia swab. Patient instructed that we will call if these results are positive and to obtain from intercourse until these results come back. Clinical Impression:  1.  Bacterial vaginosis        Disposition Vitals:  [unfilled], [unfilled], [unfilled], [unfilled]    Disposition referral (if applicable):  Ciara Amin MD  [place] [address]  [address]    Schedule an appointment as soon as possible for a visit   If symptoms worsen      Disposition medications (if applicable):  New Prescriptions    METRONIDAZOLE (FLAGYL) 500 MG TABLET    Take 1 tablet by mouth 2 times daily for 7 days         (Please note that portions of this note may have been completed with a voice recognition program. Efforts were made to edit the dictations but occasionally words are mis-transcribed.)    MD Evelyn Nicole MD  11/06/21 0185

## 2021-11-08 LAB
C TRACH DNA GENITAL QL NAA+PROBE: NEGATIVE
N. GONORRHOEAE DNA: NEGATIVE

## 2021-12-28 ENCOUNTER — HOSPITAL ENCOUNTER (EMERGENCY)
Age: 31
Discharge: HOME | End: 2021-12-28
Attending: EMERGENCY MEDICINE
Payer: COMMERCIAL

## 2021-12-28 VITALS
SYSTOLIC BLOOD PRESSURE: 122 MMHG | HEART RATE: 80 BPM | TEMPERATURE: 98.3 F | OXYGEN SATURATION: 99 % | WEIGHT: 163.14 LBS | RESPIRATION RATE: 14 BRPM | HEIGHT: 72 IN | BODY MASS INDEX: 22.1 KG/M2 | DIASTOLIC BLOOD PRESSURE: 73 MMHG

## 2021-12-28 DIAGNOSIS — B96.89 BACTERIAL VAGINOSIS: ICD-10-CM

## 2021-12-28 DIAGNOSIS — N76.0 BACTERIAL VAGINOSIS: ICD-10-CM

## 2021-12-28 DIAGNOSIS — N39.0 ACUTE URINARY TRACT INFECTION: Primary | ICD-10-CM

## 2021-12-28 DIAGNOSIS — Z20.822 COVID-19 VIRUS TEST RESULT UNKNOWN: ICD-10-CM

## 2021-12-28 LAB
BACTERIA WET PREP: ABNORMAL
BACTERIA: ABNORMAL /HPF
BILIRUBIN URINE: NEGATIVE
BLOOD, URINE: ABNORMAL
CLARITY: ABNORMAL
CLUE CELLS: ABNORMAL
COLOR: YELLOW
EPITHELIAL CELLS WET PREP: ABNORMAL
EPITHELIAL CELLS, UA: ABNORMAL /HPF (ref 0–5)
GLUCOSE URINE: NEGATIVE MG/DL
HCG(URINE) PREGNANCY TEST: NEGATIVE
KETONES, URINE: NEGATIVE MG/DL
LEUKOCYTE ESTERASE, URINE: ABNORMAL
MICROSCOPIC EXAMINATION: YES
NITRITE, URINE: POSITIVE
PH UA: 6 (ref 5–8)
PROTEIN UA: NEGATIVE MG/DL
RBC UA: ABNORMAL /HPF (ref 0–4)
RBC WET PREP: ABNORMAL
SOURCE WET PREP: ABNORMAL
SPECIFIC GRAVITY UA: 1.02 (ref 1–1.03)
TRICHOMONAS PREP: ABNORMAL
TRICHOMONAS: ABNORMAL /HPF
URINE REFLEX TO CULTURE: YES
URINE TYPE: ABNORMAL
UROBILINOGEN, URINE: 1 E.U./DL
WBC UA: >100 /HPF (ref 0–5)
WBC WET PREP: ABNORMAL
YEAST WET PREP: ABNORMAL

## 2021-12-28 PROCEDURE — 87186 SC STD MICRODIL/AGAR DIL: CPT

## 2021-12-28 PROCEDURE — 84703 CHORIONIC GONADOTROPIN ASSAY: CPT

## 2021-12-28 PROCEDURE — 99283 EMERGENCY DEPT VISIT LOW MDM: CPT

## 2021-12-28 PROCEDURE — 87210 SMEAR WET MOUNT SALINE/INK: CPT

## 2021-12-28 PROCEDURE — 87077 CULTURE AEROBIC IDENTIFY: CPT

## 2021-12-28 PROCEDURE — 87086 URINE CULTURE/COLONY COUNT: CPT

## 2021-12-28 PROCEDURE — 87591 N.GONORRHOEAE DNA AMP PROB: CPT

## 2021-12-28 PROCEDURE — 81001 URINALYSIS AUTO W/SCOPE: CPT

## 2021-12-28 PROCEDURE — U0003 INFECTIOUS AGENT DETECTION BY NUCLEIC ACID (DNA OR RNA); SEVERE ACUTE RESPIRATORY SYNDROME CORONAVIRUS 2 (SARS-COV-2) (CORONAVIRUS DISEASE [COVID-19]), AMPLIFIED PROBE TECHNIQUE, MAKING USE OF HIGH THROUGHPUT TECHNOLOGIES AS DESCRIBED BY CMS-2020-01-R: HCPCS

## 2021-12-28 PROCEDURE — 87491 CHLMYD TRACH DNA AMP PROBE: CPT

## 2021-12-28 PROCEDURE — U0005 INFEC AGEN DETEC AMPLI PROBE: HCPCS

## 2021-12-28 RX ORDER — METRONIDAZOLE 7.5 MG/G
GEL VAGINAL
Qty: 70 G | Refills: 0 | Status: SHIPPED | OUTPATIENT
Start: 2021-12-28 | End: 2022-01-04

## 2021-12-28 RX ORDER — METRONIDAZOLE 500 MG/1
500 TABLET ORAL 2 TIMES DAILY
Qty: 14 TABLET | Refills: 0 | Status: SHIPPED | OUTPATIENT
Start: 2021-12-28 | End: 2021-12-28

## 2021-12-28 RX ORDER — CEFUROXIME AXETIL 250 MG/1
250 TABLET ORAL 2 TIMES DAILY
Qty: 14 TABLET | Refills: 0 | Status: SHIPPED | OUTPATIENT
Start: 2021-12-28 | End: 2022-01-04

## 2021-12-28 NOTE — Clinical Note
Valarie Barrera was seen and treated in our emergency department on 12/28/2021. She may return to work on 01/07/2022. If covid test is negative can return with improved symptoms of 24 hours. If test is positive can return January 7th. If you have any questions or concerns, please don't hesitate to call.       Joselin Ribeiro MD

## 2021-12-28 NOTE — ED PROVIDER NOTES
[facility] ENCOUNTER      Pt Name: Dralene Velasco  MRN: [id number]  [place] 1990  Date of evaluation: 12/28/2021  Provider: Patricio Kim MD    CHIEF COMPLAINT     I think I have a UTI  HISTORY OF PRESENT ILLNESS  (Location/Symptom, Timing/Onset,Context/Setting, Quality, Duration, Modifying Factors, Severity). Note limiting factors. Chief Complaint   Patient presents with    Dysuria     pt c/o frequent urination past week with vaginal discharge white      Darlene Velasco is a 33 y.o. female who presents to the emergency department secondary to concern for potential UTI. She states she has been drinking a lot of water. Endorses increased frequency and urgency denies any dysuria. Denies any hematuria. Denies any fevers, chills, nausea, vomiting, abdominal pain. She does state she felt hot yesterday. Took an ibuprofen this morning which helped with feelings of achiness that started yesterday when she felt hot. Has had her covid vaccine, not yet been boostered. Would like to be tested because a co worker tested positive and she has been around them. Past medical history noted below, significant for chronic right-sided back pain, depression. Denies smoking. Aside from what is stated above denies any other symptoms or modifying factors. Nursing Notes reviewed. REVIEW OF SYSTEMS  (2-9 systems for level 4, 10 or more for level 5)   Review of Systems  Pertinent positive and negative findings as documented in the HPI;   PAST MEDICAL HISTORY     Past Medical History:   Diagnosis Date    Chronic right-sided thoracic back pain 6/7/2016    Depression 12/20/2018       SURGICALHISTORY     History reviewed. No pertinent surgical history.   CURRENT MEDICATIONS       Previous Medications    ACETAMINOPHEN (TYLENOL) 325 MG TABLET    Take 2 tablets by mouth every 6 hours as needed for Pain or Fever    DIVALPROEX (DEPAKOTE) 250 MG DR TABLET    Take 1 tablet by mouth 2 times daily    IBUPROFEN (ADVIL;MOTRIN) 400 MG TABLET    Take 1 tablet by mouth 4 times daily as needed for Pain or Fever    OMEPRAZOLE (PRILOSEC) 20 MG DELAYED RELEASE CAPSULE    Take 1 capsule by mouth every morning (before breakfast)    VITAMIN D (ERGOCALCIFEROL) 1.25 MG (91037 UT) CAPS CAPSULE    Take 1 capsule by mouth once a week      ALLERGIES     Patient has no known allergies. FAMILY HISTORY       Family History   Problem Relation Age of Onset    High Blood Pressure Mother      SOCIAL HISTORY       Social History     Socioeconomic History    Marital status: Single     Spouse name: None    Number of children: None    Years of education: None    Highest education level: None   Occupational History    None   Tobacco Use    Smoking status: Never Smoker    Smokeless tobacco: Never Used   Substance and Sexual Activity    Alcohol use: Yes     Comment: socially    Drug use: No    Sexual activity: Yes     Partners: Male   Other Topics Concern    None   Social History Narrative    None     Social Determinants of Health     Financial Resource Strain:     Difficulty of Paying Living Expenses: Not on file   Food Insecurity:     Worried About Running Out of Food in the Last Year: Not on file    Jonathan of Food in the Last Year: Not on file   Transportation Needs:     Lack of Transportation (Medical): Not on file    Lack of Transportation (Non-Medical):  Not on file   Physical Activity:     Days of Exercise per Week: Not on file    Minutes of Exercise per Session: Not on file   Stress:     Feeling of Stress : Not on file   Social Connections:     Frequency of Communication with Friends and Family: Not on file    Frequency of Social Gatherings with Friends and Family: Not on file    Attends Catholic Services: Not on file    Active Member of Clubs or Organizations: Not on file    Attends Club or Organization Meetings: Not on file    Marital Status: Not on file   Intimate Partner Violence:     [facility] [address] [facility]  [address], [facility]   Phone [phone]   URINE RT REFLEX TO CULTURE - Abnormal; Notable for the following components:    Clarity, UA CLOUDY (*)     Blood, Urine SMALL (*)     Nitrite, Urine POSITIVE (*)     Leukocyte Esterase, Urine LARGE (*)     All other components within normal limits    Narrative:     Performed at:  [facility]  [address], [facility]   Phone [phone]   MICROSCOPIC URINALYSIS - Abnormal; Notable for the following components:    WBC, UA >100 (*)     Epithelial Cells, UA 11-20 (*)     Bacteria, UA 3+ (*)     All other components within normal limits    Narrative:     Performed at:  [facility]  [address], [facility]   Phone [phone]   CULTURE, URINE   C.TRACHOMATIS N.GONORRHOEAE DNA   PREGNANCY, URINE    Narrative:     Performed at:  [facility]  [address], [facility]   Phone [phone]       EMERGENCY DEPARTMENT COURSE and DIFFERENTIAL DIAGNOSIS/MDM:   Patient was given the following medications:  Orders Placed This Encounter   Medications    cefUROXime (CEFTIN) 250 MG tablet     Sig: Take 1 tablet by mouth 2 times daily for 7 days     Dispense:  14 tablet     Refill:  0    metroNIDAZOLE (FLAGYL) 500 MG tablet     Sig: Take 1 tablet by mouth 2 times daily for 7 days     Dispense:  14 tablet     Refill:  0     CONSULTS:  None    INITIAL VITALS: BP: 122/73, Temp: 98.3 °F (36.8 °C), Pulse: 80, Resp: 14, SpO2: 99 %   RECENT VITALS:  BP: 122/73,Temp: 98.3 °F (36.8 °C), Pulse: 80, Resp: 14, SpO2: 99 %     Lida Magana is a 33 y.o. female who presents to the emergency department secondary to concern for symptoms as noted in HPI. On arrival she is awake, alert, oriented. Vitals are hemodynamically stable.   Physical exam is reassuring as noted above with clear lungs, benign abdomen, no peripheral edema. She answers questions appropriately complete sentences. At the time of my assessment her labs had returned notable for signs of UTI. During my exam she did request testing also for bacterial vaginosis and a wet prep was ordered. She also reported she had been exposed to Covid and given her body aches she was tested as well for Covid. Wet prep was positive for bacterial vaginosis. Discussed results of findings. Discussed symptomatic treatment and antibiotic treatment which was sent to the pharmacy for both her UTI and her bacterial vaginosis. Discussed return precautions as well as continued isolation/quarantine while awaiting Covid results. She expressed understanding of all instructions and was discharged home in stable condition. FINAL IMPRESSION      1. Acute urinary tract infection    2. COVID-19 virus test result unknown    3.  Bacterial vaginosis        DISPOSITION/PLAN   DISPOSITION        PATIENT REFERRED TO:  Kimberly Pierson MD  [address] [place] [phone]    Schedule an appointment as soon as possible for a visit   For follow up appointment, As needed      DISCHARGE MEDICATIONS:  New Prescriptions    CEFUROXIME (CEFTIN) 250 MG TABLET    Take 1 tablet by mouth 2 times daily for 7 days    METRONIDAZOLE (FLAGYL) 500 MG TABLET    Take 1 tablet by mouth 2 times daily for 7 days            (Please note that portions of this note were completed with a voice recognition program. Efforts were made to edit the dictations but occasionally words are mis-transcribed.)    Kelechi oMra MD (electronically signed)  Attending Emergency Physician        Kelechi Mora MD  12/28/21 8111

## 2021-12-29 LAB
C TRACH DNA GENITAL QL NAA+PROBE: NEGATIVE
N. GONORRHOEAE DNA: NEGATIVE
SARS-COV-2: NOT DETECTED

## 2021-12-30 LAB
ORGANISM: ABNORMAL
URINE CULTURE, ROUTINE: ABNORMAL

## 2021-12-30 NOTE — RESULT ENCOUNTER NOTE
Urine culture results reviewed, positive for citrobacter freundii. Treated appropriately in ED, no changes needed.

## 2022-02-13 ENCOUNTER — HOSPITAL ENCOUNTER (EMERGENCY)
Age: 32
Discharge: HOME | End: 2022-02-13
Payer: COMMERCIAL

## 2022-02-13 ENCOUNTER — APPOINTMENT (OUTPATIENT)
Dept: CT IMAGING | Age: 32
End: 2022-02-13
Payer: COMMERCIAL

## 2022-02-13 VITALS
SYSTOLIC BLOOD PRESSURE: 123 MMHG | DIASTOLIC BLOOD PRESSURE: 77 MMHG | OXYGEN SATURATION: 99 % | BODY MASS INDEX: 22.01 KG/M2 | HEIGHT: 72 IN | HEART RATE: 74 BPM | RESPIRATION RATE: 18 BRPM | WEIGHT: 162.48 LBS | TEMPERATURE: 97.9 F
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
## 2022-02-13 DIAGNOSIS — S09.90XA INJURY OF HEAD, INITIAL ENCOUNTER: ICD-10-CM

## 2022-02-13 DIAGNOSIS — S00.81XA ABRASION OF FACE, INITIAL ENCOUNTER: Primary | ICD-10-CM

## 2022-02-13 PROCEDURE — 90471 IMMUNIZATION ADMIN: CPT | Performed by: PHYSICIAN ASSISTANT

## 2022-02-13 PROCEDURE — 90715 TDAP VACCINE 7 YRS/> IM: CPT | Performed by: PHYSICIAN ASSISTANT

## 2022-02-13 PROCEDURE — 6370000000 HC RX 637 (ALT 250 FOR IP): Performed by: PHYSICIAN ASSISTANT

## 2022-02-13 PROCEDURE — 99283 EMERGENCY DEPT VISIT LOW MDM: CPT

## 2022-02-13 PROCEDURE — 70486 CT MAXILLOFACIAL W/O DYE: CPT

## 2022-02-13 PROCEDURE — 6360000002 HC RX W HCPCS: Performed by: PHYSICIAN ASSISTANT

## 2022-02-13 PROCEDURE — 70450 CT HEAD/BRAIN W/O DYE: CPT

## 2022-02-13 RX ORDER — ACETAMINOPHEN 325 MG/1
650 TABLET ORAL ONCE
Status: COMPLETED | OUTPATIENT
Start: 2022-02-13 | End: 2022-02-13

## 2022-02-13 RX ADMIN — TETANUS TOXOID, REDUCED DIPHTHERIA TOXOID AND ACELLULAR PERTUSSIS VACCINE, ADSORBED 0.5 ML: 5; 2.5; 8; 8; 2.5 SUSPENSION INTRAMUSCULAR at 19:11

## 2022-02-13 RX ADMIN — ACETAMINOPHEN 650 MG: 325 TABLET ORAL at 19:10

## 2022-02-13 ASSESSMENT — PAIN DESCRIPTION - LOCATION: LOCATION: FACE

## 2022-02-13 ASSESSMENT — VISUAL ACUITY
OD: 20/25
OS: 20/20
OU: 20/20

## 2022-02-13 ASSESSMENT — PAIN SCALES - GENERAL
PAINLEVEL_OUTOF10: 3
PAINLEVEL_OUTOF10: 3

## 2022-02-13 ASSESSMENT — PAIN DESCRIPTION - DESCRIPTORS: DESCRIPTORS: SORE

## 2022-02-13 ASSESSMENT — PAIN DESCRIPTION - PAIN TYPE: TYPE: ACUTE PAIN
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
## 2022-02-14 NOTE — ED NOTES
Pain to scratch areas at 3. Waiting for PA-C to review CT results and do closer eye exam.   Talking on phone,.      Elise Hill RN  02/14/22 0015

## 2022-02-14 NOTE — ED NOTES
Scratches to face cleaned with hibiclens and saline. PSO applied.      Danika Kiran RN  02/13/22 2045
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
## 2022-03-09 ENCOUNTER — TELEMEDICINE (OUTPATIENT)
Dept: PRIMARY CARE CLINIC | Age: 32
End: 2022-03-09
Payer: COMMERCIAL

## 2022-03-09 DIAGNOSIS — R21 FACIAL RASH: Primary | ICD-10-CM

## 2022-03-09 DIAGNOSIS — F32.89 OTHER DEPRESSION: ICD-10-CM

## 2022-03-09 PROCEDURE — G8428 CUR MEDS NOT DOCUMENT: HCPCS | Performed by: INTERNAL MEDICINE

## 2022-03-09 PROCEDURE — 99214 OFFICE O/P EST MOD 30 MIN: CPT | Performed by: INTERNAL MEDICINE

## 2022-03-09 RX ORDER — FLUOXETINE HYDROCHLORIDE 20 MG/1
20 CAPSULE ORAL DAILY
Qty: 30 CAPSULE | Refills: 3 | Status: SHIPPED | OUTPATIENT
Start: 2022-03-09 | End: 2022-09-15

## 2022-03-09 NOTE — PROGRESS NOTES
Madie Andres (:  1990) is a Established patient, here for evaluation of the following:    Assessment & Plan   Below is the assessment and plan developed based on review of pertinent history, physical exam, labs, studies, and medications. Persistent facial rash affecting the area underneath her right eye both cheeks or chin and the area underneath her chin. The patient was last on Depakote for depression and is requesting a different medication because of side effects from the Depakote ( fatigue). On Prozac grams daily and refer to the dermatologist.  I have also referred her to a psychiatrist for follow-up. 1. Facial rash  -     [facility]  2. Other depression  -     FLUoxetine (PROZAC) 20 MG capsule; Take 1 capsule by mouth daily, Disp-30 capsule, R-3Normal    Return in about 3 months (around 2022).        Subjective   HPI  Review of Systems       Objective   Patient-Reported Vitals  No data recorded     Physical Exam  [INSTRUCTIONS:  \"[x]\" Indicates a positive item  \"[]\" Indicates a negative item  -- DELETE ALL ITEMS NOT EXAMINED]    Constitutional: [x] Appears well-developed and well-nourished [x] No apparent distress      [] Abnormal -     Mental status: [x] Alert and awake  [x] Oriented to person/place/time [x] Able to follow commands    [] Abnormal -     Eyes:   EOM    [x]  Normal    [] Abnormal -   Sclera  [x]  Normal    [] Abnormal -          Discharge [x]  None visible   [] Abnormal -     HENT: [x] Normocephalic, atraumatic  [] Abnormal -   [x] Mouth/Throat: Mucous membranes are moist    External Ears [x] Normal  [] Abnormal -    Neck: [x] No visualized mass [] Abnormal -     Pulmonary/Chest: [x] Respiratory effort normal   [x] No visualized signs of difficulty breathing or respiratory distress        [] Abnormal -      Musculoskeletal:   [x] Normal gait with no signs of ataxia         [x] Normal range of motion of neck        [] Abnormal -     Neurological:        [x] No Facial Asymmetry (Cranial nerve 7 motor function) (limited exam due to video visit)          [x] No gaze palsy        [] Abnormal -          Skin:        [x] No significant exanthematous lesions or discoloration noted on facial skin         [] Abnormal -            Psychiatric:       [x] Normal Affect [] Abnormal -        [x] No Hallucinations    Other pertinent observable physical exam findings:-             On this date 3/9/2022 I have spent 30 minutes reviewing previous notes, test results and face to face (virtual) with the patient discussing the diagnosis and importance of compliance with the treatment plan as well as documenting on the day of the visit. Kennedi Darnell, was evaluated through a synchronous (real-time) audio-video encounter. The patient (or guardian if applicable) is aware that this is a billable service, which includes applicable co-pays. This Virtual Visit was conducted with patient's (and/or legal guardian's) consent. The visit was conducted pursuant to the emergency declaration under the [address] [place] [address] authority and the [employer] and [employer] General Act. Patient identification was verified, and a caregiver was present when appropriate. The patient was located at home in a state where the provider was licensed to provide care.        --Ha Babb MD

## 2022-03-09 NOTE — Clinical Note
Send the patient the list of psychiatrists available to see her.   She is concerned because she has care source and has had problems finding a psychiatrist in the past.

## 2022-03-09 NOTE — PATIENT INSTRUCTIONS
A list of psychiatrists that are available to see you will be sent to you by the medical assistant. Please call the office if you do not receive it. A dermatology referral has also been sent. Prozac 20 mg daily may help with the depression in the meantime. Follow-up with me in 3 months in the office.

## 2022-03-14 ENCOUNTER — TELEPHONE (OUTPATIENT)
Dept: PRIMARY CARE CLINIC | Age: 32
End: 2022-03-14

## 2022-03-14 NOTE — TELEPHONE ENCOUNTER
----- Message from Abigail Izaguirre sent at 3/14/2022 10:01 AM EDT -----  Subject: Referral Request    QUESTIONS   Reason for referral request? Patient needs a referral for another   dermatologist because the first one is not accepting new patients and was   told to call doctor for a new referral. Please call back and advise. Has the physician seen you for this condition before? Yes  Select a date? 2022-03-09  Select the Provider the patient wants to be referred to, if known (PCP or   Specialist)? Ingrid Hooks   Preferred Specialist (if applicable)? Do you already have an appointment scheduled? No  Additional Information for Provider?   ---------------------------------------------------------------------------  --------------  CALL BACK INFO  What is the best way for the office to contact you? OK to leave message on   voicemail  Preferred Call Back Phone Number?  [phone]

## 2022-03-14 NOTE — TELEPHONE ENCOUNTER
Advised pt to contact her insurance company to see they will cover and let us know so that we can put in a new referral, pt verbalized understanding

## 2022-03-28 ENCOUNTER — TELEPHONE (OUTPATIENT)
Dept: PRIMARY CARE CLINIC | Age: 32
End: 2022-03-28

## 2022-03-28 DIAGNOSIS — R21 FACIAL RASH: Primary | ICD-10-CM

## 2022-03-28 NOTE — TELEPHONE ENCOUNTER
----- Message from [place] Peggyjuanito sent at 3/25/2022  4:37 PM EDT -----  Subject: Message to Provider    QUESTIONS  Information for Provider? Pt. states she found a dermatologist that will   take her insurance. Pt. would like a referral sent to this office. The   number is [phone].  ---------------------------------------------------------------------------  --------------  Rocio Carrier INFO  What is the best way for the office to contact you? OK to leave message on   voicemail  Preferred Call Back Phone Number? [phone]  ---------------------------------------------------------------------------  --------------  SCRIPT ANSWERS  Relationship to Patient?  Self

## 2022-04-08 ENCOUNTER — TELEPHONE (OUTPATIENT)
Dept: PRIMARY CARE CLINIC | Age: 32
End: 2022-04-08

## 2022-04-08 NOTE — TELEPHONE ENCOUNTER
----- Message from Palmer Harvey sent at 4/8/2022  1:22 PM EDT -----  Subject: Message to Provider    QUESTIONS  Information for Provider? pt is calling about her referral she wants to be   sent [facility] [phone].  ---------------------------------------------------------------------------  --------------  [address]  What is the best way for the office to contact you? OK to leave message on   voicemail  Preferred Call Back Phone Number? [phone]  ---------------------------------------------------------------------------  --------------  SCRIPT ANSWERS  Relationship to Patient?  Self

## 2022-09-15 ENCOUNTER — HOSPITAL ENCOUNTER (EMERGENCY)
Age: 32
Discharge: HOME | End: 2022-09-15
Attending: EMERGENCY MEDICINE
Payer: COMMERCIAL

## 2022-09-15 ENCOUNTER — APPOINTMENT (OUTPATIENT)
Dept: GENERAL RADIOLOGY | Age: 32
End: 2022-09-15
Payer: COMMERCIAL

## 2022-09-15 VITALS
SYSTOLIC BLOOD PRESSURE: 118 MMHG | TEMPERATURE: 98.5 F | BODY MASS INDEX: 22.4 KG/M2 | HEIGHT: 72 IN | OXYGEN SATURATION: 100 % | DIASTOLIC BLOOD PRESSURE: 75 MMHG | HEART RATE: 66 BPM | WEIGHT: 165.34 LBS | RESPIRATION RATE: 16 BRPM

## 2022-09-15 DIAGNOSIS — R82.71 BACTERIA IN URINE: ICD-10-CM

## 2022-09-15 DIAGNOSIS — M25.561 CHRONIC PAIN OF RIGHT KNEE: Primary | ICD-10-CM

## 2022-09-15 DIAGNOSIS — G89.29 CHRONIC PAIN OF RIGHT KNEE: Primary | ICD-10-CM

## 2022-09-15 LAB
BACTERIA: ABNORMAL /HPF
BILIRUBIN URINE: NEGATIVE
BLOOD, URINE: ABNORMAL
CLARITY: ABNORMAL
COLOR: YELLOW
EPITHELIAL CELLS, UA: ABNORMAL /HPF (ref 0–5)
GLUCOSE URINE: NEGATIVE MG/DL
HCG(URINE) PREGNANCY TEST: NEGATIVE
KETONES, URINE: NEGATIVE MG/DL
LEUKOCYTE ESTERASE, URINE: ABNORMAL
MICROSCOPIC EXAMINATION: YES
NITRITE, URINE: NEGATIVE
PH UA: 6.5 (ref 5–8)
PROTEIN UA: NEGATIVE MG/DL
RBC UA: ABNORMAL /HPF (ref 0–4)
SPECIFIC GRAVITY UA: 1.02 (ref 1–1.03)
URINE REFLEX TO CULTURE: ABNORMAL
URINE TYPE: ABNORMAL
UROBILINOGEN, URINE: 1 E.U./DL
WBC UA: ABNORMAL /HPF (ref 0–5)

## 2022-09-15 PROCEDURE — 81001 URINALYSIS AUTO W/SCOPE: CPT

## 2022-09-15 PROCEDURE — 99284 EMERGENCY DEPT VISIT MOD MDM: CPT

## 2022-09-15 PROCEDURE — 84703 CHORIONIC GONADOTROPIN ASSAY: CPT

## 2022-09-15 PROCEDURE — 73562 X-RAY EXAM OF KNEE 3: CPT

## 2022-09-15 PROCEDURE — 6370000000 HC RX 637 (ALT 250 FOR IP): Performed by: EMERGENCY MEDICINE

## 2022-09-15 RX ORDER — IBUPROFEN 600 MG/1
600 TABLET ORAL EVERY 8 HOURS PRN
Qty: 15 TABLET | Refills: 0 | Status: SHIPPED | OUTPATIENT
Start: 2022-09-15 | End: 2022-09-20

## 2022-09-15 RX ORDER — IBUPROFEN 600 MG/1
600 TABLET ORAL ONCE
Status: COMPLETED | OUTPATIENT
Start: 2022-09-15 | End: 2022-09-15

## 2022-09-15 RX ORDER — CEPHALEXIN 500 MG/1
500 CAPSULE ORAL 3 TIMES DAILY
Qty: 21 CAPSULE | Refills: 0 | Status: SHIPPED | OUTPATIENT
Start: 2022-09-15 | End: 2022-09-22

## 2022-09-15 RX ORDER — CEPHALEXIN 500 MG/1
500 CAPSULE ORAL ONCE
Status: COMPLETED | OUTPATIENT
Start: 2022-09-15 | End: 2022-09-15

## 2022-09-15 RX ADMIN — CEPHALEXIN 500 MG: 500 CAPSULE ORAL at 12:02

## 2022-09-15 RX ADMIN — IBUPROFEN 600 MG: 600 TABLET ORAL at 12:02

## 2022-09-15 ASSESSMENT — ENCOUNTER SYMPTOMS
TROUBLE SWALLOWING: 0
DIARRHEA: 0
SHORTNESS OF BREATH: 0
NAUSEA: 0
VOMITING: 0
BACK PAIN: 1
VOICE CHANGE: 0

## 2022-09-15 ASSESSMENT — PAIN SCALES - GENERAL
PAINLEVEL_OUTOF10: 8
PAINLEVEL_OUTOF10: 1

## 2022-09-15 ASSESSMENT — PAIN DESCRIPTION - INTENSITY
RATING_2: 9
RATING_2: 1

## 2022-09-15 ASSESSMENT — PAIN DESCRIPTION - PAIN TYPE: TYPE: CHRONIC PAIN

## 2022-09-15 ASSESSMENT — PAIN DESCRIPTION - DESCRIPTORS
DESCRIPTORS_2: ACHING;BURNING
DESCRIPTORS: ACHING

## 2022-09-15 ASSESSMENT — LIFESTYLE VARIABLES
HOW MANY STANDARD DRINKS CONTAINING ALCOHOL DO YOU HAVE ON A TYPICAL DAY: 1 OR 2
HOW OFTEN DO YOU HAVE A DRINK CONTAINING ALCOHOL: MONTHLY OR LESS
HOW OFTEN DO YOU HAVE A DRINK CONTAINING ALCOHOL: MONTHLY OR LESS
HOW MANY STANDARD DRINKS CONTAINING ALCOHOL DO YOU HAVE ON A TYPICAL DAY: 1 OR 2

## 2022-09-15 ASSESSMENT — PAIN - FUNCTIONAL ASSESSMENT
PAIN_FUNCTIONAL_ASSESSMENT: 0-10
PAIN_FUNCTIONAL_ASSESSMENT_SITE2: PREVENTS OR INTERFERES SOME ACTIVE ACTIVITIES AND ADLS

## 2022-09-15 ASSESSMENT — PAIN DESCRIPTION - LOCATION
LOCATION_2: BACK
LOCATION: KNEE

## 2022-09-15 NOTE — Clinical Note
Luba Guallpa was seen and treated in our emergency department on 9/15/2022. She may return to work on 09/16/2022. If you have any questions or concerns, please don't hesitate to call.       James Parish MD

## 2022-09-15 NOTE — ED PROVIDER NOTES
[id number] [facility]  eMERGENCY dEPARTMENT eNCOUnter      Pt Name: Liborio Reeves  MRN: [id number]  [place] 1990  Date of evaluation: 9/15/2022  Provider: Brandon Ugarte MD    [address] [place] [address]       Chief Complaint   Patient presents with    Knee Pain    Back Pain     HISTORY OF PRESENT ILLNESS   (Location/Symptom, Timing/Onset, Context/Setting, Quality, Duration, Modifying Factors, Severity)  Note limiting factors. Liborio Reeves is a 27 y.o. female who reports 2 months of right knee pain as well as 6 years of bilateral lower back pain. Patient denies any new trauma or falls. Patient reports symptoms are moderate constant and worsening. Patient reports bending moving and tactile pressure worsens pain nothing improves it. Patient Ny Sushant any anterior abdominal pain, dysuria, numbness, weakness, urinary or bowel incontinence, or focal neurologic deficits. HPI    Nursing Notes were reviewed. REVIEW OFSYSTEMS    (2-9 systems for level 4, 10 or more for level 5)     Review of Systems   Constitutional:  Negative for appetite change and fever. HENT:  Negative for trouble swallowing and voice change. Eyes:  Negative for visual disturbance. Respiratory:  Negative for shortness of breath. Cardiovascular:  Negative for chest pain and palpitations. Gastrointestinal:  Negative for diarrhea, nausea and vomiting. Genitourinary:  Negative for dysuria. Musculoskeletal:  Positive for arthralgias and back pain. Negative for gait problem. Neurological:  Negative for seizures and syncope. Psychiatric/Behavioral:  Negative for self-injury and suicidal ideas. Except as noted above the remainder of the review of systems was reviewed and negative. PAST MEDICAL HISTORY     Past Medical History:   Diagnosis Date    Chronic right-sided thoracic back pain 06/07/2016    Depression 12/20/2018         SURGICAL HISTORY     History reviewed. No pertinent surgical history.       CURRENT MEDICATIONS       Previous Medications    No medications on file       ALLERGIES     Patient has no known allergies. FAMILY HISTORY       Family History   Problem Relation Age of Onset    High Blood Pressure Mother           SOCIAL HISTORY       Social History     Socioeconomic History    Marital status: Single     Spouse name: None    Number of children: None    Years of education: None    Highest education level: None   Tobacco Use    Smoking status: Never    Smokeless tobacco: Never   Vaping Use    Vaping Use: Some days    Substances: THC    Devices: Refillable tank   Substance and Sexual Activity    Alcohol use: Yes     Comment: once every 2 months    Drug use: No    Sexual activity: Yes     Partners: Male         PHYSICAL EXAM    (up to 7 for level 4, 8 or more for level 5)     ED Triage Vitals [09/15/22 0956]   BP Temp Temp Source Heart Rate Resp SpO2 Height Weight   118/75 98.5 °F (36.9 °C) Oral 66 16 100 % 6' (1.829 m) 165 lb 5.5 oz (75 kg)       Physical Exam  Constitutional:       General: She is not in acute distress. Appearance: She is well-developed. HENT:      Head: Normocephalic and atraumatic. Eyes:      Conjunctiva/sclera: Conjunctivae normal.   Neck:      Vascular: No JVD. Cardiovascular:      Rate and Rhythm: Normal rate. Pulses: Normal pulses. Comments: 2+ popliteal and DP pulses to right lower extremity. Pulmonary:      Effort: Pulmonary effort is normal. No respiratory distress. Abdominal:      Tenderness: There is no abdominal tenderness. There is no rebound. Comments: No anterior abdominal tenderness to palpation. No rebound, guarding, peritoneal signs. Musculoskeletal:         General: Tenderness present. No deformity. Comments: Mild diffuse tenderness to anterior right knee with no focal area of tenderness. No swelling or redness. No tenderness to calf or posterior thigh. No palpable venous cord. Negative Homans' sign.   Mild bilateral paraspinal lumbar tenderness. No CVA tenderness to percussion. No palpable deformity. Neurological:      Mental Status: She is alert. Comments: No saddle anesthesia. Cessation intact in all nerve distributions of bilateral lower extremities. 2+ patellar reflexes equal bilaterally. Normal gait on own power. DIAGNOSTIC RESULTS       RADIOLOGY:     Interpretation per the Radiologist below, if available at the time of this note:    XR KNEE RIGHT (3 VIEWS)   Preliminary Result   Unremarkable radiographs of the right knee. LABS:  Labs Reviewed   URINALYSIS WITH REFLEX TO CULTURE - Abnormal; Notable for the following components:       Result Value    Clarity, UA CLOUDY (*)     Blood, Urine SMALL (*)     Leukocyte Esterase, Urine TRACE (*)     All other components within normal limits   MICROSCOPIC URINALYSIS - Abnormal; Notable for the following components:    WBC, UA 6-9 (*)     Epithelial Cells, UA 21-50 (*)     Bacteria, UA 1+ (*)     All other components within normal limits   PREGNANCY, URINE       All otherlabs were within normal range or not returned as of this dictation. EMERGENCY DEPARTMENT COURSE and DIFFERENTIAL DIAGNOSIS/MDM:   Vitals:    Vitals:    09/15/22 0956   BP: 118/75   Pulse: 66   Resp: 16   Temp: 98.5 °F (36.9 °C)   TempSrc: Oral   SpO2: 100%   Weight: 165 lb 5.5 oz (75 kg)   Height: 6' (1.829 m)         MDM  All vital signs within normal limits per patient are vastly intact. Plain film of the knee does not show any acute abnormalities. X-ray does show mild bacteriuria. Feel patient is appropriate for ibuprofen, Keflex, Ortho clinic and primary care follow-up. Standard ER return precautions given for new or worsening symptoms. Patient expresses understanding and agreement with this plan and is discharged home.     I estimate there is low risk for COMPARTMENT SYNDROME, DEEP VENOUS THROMBOSIS, SEPTIC ARTHRITIS, TENDON OR NEUROVASCULAR INJURY, thus I consider the discharge disposition reasonable. The patient and I have discussed the diagnosis and risks, and we agree with discharging home to follow-up with their primary doctor or the referral orthopedist. We also discussed returning to the Emergency Department immediately if new or worsening symptoms occur. We have discussed the symptoms which are most concerning (e.g., changing or worsening pain, numbness, weakness) that necessitate immediate return        Procedures    FINAL IMPRESSION      1. Chronic pain of right knee    2. Bacteria in urine          DISPOSITION/PLAN   DISPOSITION Decision To Discharge 09/15/2022 12:01:09 PM      PATIENT REFERRED TO:  [address]  [address] [facility] [address] [facility] [address]  In 1 week      [facility] [address] [facility]  [address] [facility] [phone]    In 1 week      [place] [id number] [phone]    If symptoms worsen    MEDICATIONS:  New Prescriptions    CEPHALEXIN (KEFLEX) 500 MG CAPSULE    Take 1 capsule by mouth 3 times daily for 7 days    IBUPROFEN (ADVIL;MOTRIN) 600 MG TABLET    Take 1 tablet by mouth every 8 hours as needed for Pain          (Please note that portions of this note were completed with a voice recognition program.  Efforts were made to edit the dictations but occasionally words aremis-transcribed. )    Timo Hedrick MD (electronically signed)  Attending Emergency Physician           Timo Hedrick MD  09/15/22 0231

## 2022-09-19 ENCOUNTER — OFFICE VISIT (OUTPATIENT)
Dept: ORTHOPEDIC SURGERY | Age: 32
End: 2022-09-19
Payer: COMMERCIAL

## 2022-09-19 VITALS — HEIGHT: 72 IN | WEIGHT: 165 LBS | RESPIRATION RATE: 18 BRPM | BODY MASS INDEX: 22.35 KG/M2

## 2022-09-19 DIAGNOSIS — M41.9 SCOLIOSIS OF THORACOLUMBAR SPINE, UNSPECIFIED SCOLIOSIS TYPE: ICD-10-CM

## 2022-09-19 DIAGNOSIS — M54.41 CHRONIC RIGHT-SIDED LOW BACK PAIN WITH RIGHT-SIDED SCIATICA: Primary | ICD-10-CM

## 2022-09-19 DIAGNOSIS — G89.29 CHRONIC RIGHT-SIDED LOW BACK PAIN WITH RIGHT-SIDED SCIATICA: Primary | ICD-10-CM

## 2022-09-19 PROCEDURE — G8420 CALC BMI NORM PARAMETERS: HCPCS | Performed by: PHYSICIAN ASSISTANT

## 2022-09-19 PROCEDURE — 1036F TOBACCO NON-USER: CPT | Performed by: PHYSICIAN ASSISTANT

## 2022-09-19 PROCEDURE — G8427 DOCREV CUR MEDS BY ELIG CLIN: HCPCS | Performed by: PHYSICIAN ASSISTANT

## 2022-09-19 PROCEDURE — 99203 OFFICE O/P NEW LOW 30 MIN: CPT | Performed by: PHYSICIAN ASSISTANT

## 2022-09-19 RX ORDER — METHYLPREDNISOLONE 4 MG/1
TABLET ORAL
Qty: 1 KIT | Refills: 0 | Status: SHIPPED | OUTPATIENT
Start: 2022-09-19 | End: 2022-11-02 | Stop reason: ALTCHOICE
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
## 2022-10-03 ENCOUNTER — OFFICE VISIT (OUTPATIENT)
Dept: ORTHOPEDIC SURGERY | Age: 32
End: 2022-10-03
Payer: COMMERCIAL

## 2022-10-03 DIAGNOSIS — M54.41 CHRONIC RIGHT-SIDED LOW BACK PAIN WITH RIGHT-SIDED SCIATICA: Primary | ICD-10-CM

## 2022-10-03 DIAGNOSIS — G89.29 CHRONIC RIGHT-SIDED LOW BACK PAIN WITH RIGHT-SIDED SCIATICA: Primary | ICD-10-CM

## 2022-10-03 DIAGNOSIS — M41.9 SCOLIOSIS OF THORACOLUMBAR SPINE, UNSPECIFIED SCOLIOSIS TYPE: ICD-10-CM

## 2022-10-03 PROCEDURE — 99213 OFFICE O/P EST LOW 20 MIN: CPT | Performed by: PHYSICIAN ASSISTANT

## 2022-10-03 PROCEDURE — G8484 FLU IMMUNIZE NO ADMIN: HCPCS | Performed by: PHYSICIAN ASSISTANT

## 2022-10-03 PROCEDURE — G8420 CALC BMI NORM PARAMETERS: HCPCS | Performed by: PHYSICIAN ASSISTANT

## 2022-10-03 PROCEDURE — 1036F TOBACCO NON-USER: CPT | Performed by: PHYSICIAN ASSISTANT

## 2022-10-03 PROCEDURE — G8427 DOCREV CUR MEDS BY ELIG CLIN: HCPCS | Performed by: PHYSICIAN ASSISTANT
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
## 2022-10-04 ENCOUNTER — HOSPITAL ENCOUNTER (OUTPATIENT)
Dept: PHYSICAL THERAPY | Age: 32
Setting detail: THERAPIES SERIES
Discharge: HOME | End: 2022-10-04
Payer: COMMERCIAL

## 2022-10-04 PROCEDURE — 97161 PT EVAL LOW COMPLEX 20 MIN: CPT

## 2022-10-04 PROCEDURE — 97530 THERAPEUTIC ACTIVITIES: CPT

## 2022-10-04 PROCEDURE — 97110 THERAPEUTIC EXERCISES: CPT
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
## 2022-10-11 ENCOUNTER — HOSPITAL ENCOUNTER (OUTPATIENT)
Dept: PHYSICAL THERAPY | Age: 32
Setting detail: THERAPIES SERIES
Discharge: HOME | End: 2022-10-11
Payer: COMMERCIAL

## 2022-10-11 PROCEDURE — 97035 APP MDLTY 1+ULTRASOUND EA 15: CPT

## 2022-10-11 PROCEDURE — 97530 THERAPEUTIC ACTIVITIES: CPT

## 2022-10-11 PROCEDURE — 97110 THERAPEUTIC EXERCISES: CPT
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
## 2022-10-13 ENCOUNTER — HOSPITAL ENCOUNTER (OUTPATIENT)
Dept: PHYSICAL THERAPY | Age: 32
Setting detail: THERAPIES SERIES
Discharge: HOME | End: 2022-10-13
Payer: COMMERCIAL

## 2022-10-13 PROCEDURE — 97140 MANUAL THERAPY 1/> REGIONS: CPT

## 2022-10-13 PROCEDURE — 97110 THERAPEUTIC EXERCISES: CPT
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
## 2022-10-18 ENCOUNTER — HOSPITAL ENCOUNTER (OUTPATIENT)
Dept: PHYSICAL THERAPY | Age: 32
Setting detail: THERAPIES SERIES
Discharge: HOME | End: 2022-10-18
Payer: COMMERCIAL

## 2022-10-18 PROCEDURE — 97140 MANUAL THERAPY 1/> REGIONS: CPT

## 2022-10-18 PROCEDURE — 97035 APP MDLTY 1+ULTRASOUND EA 15: CPT

## 2022-10-18 PROCEDURE — 97110 THERAPEUTIC EXERCISES: CPT
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
## 2022-10-20 ENCOUNTER — HOSPITAL ENCOUNTER (OUTPATIENT)
Dept: PHYSICAL THERAPY | Age: 32
Setting detail: THERAPIES SERIES
Discharge: HOME | End: 2022-10-20
Payer: COMMERCIAL

## 2022-10-20 PROCEDURE — 97110 THERAPEUTIC EXERCISES: CPT

## 2022-10-20 PROCEDURE — 97140 MANUAL THERAPY 1/> REGIONS: CPT
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
## 2022-10-25 ENCOUNTER — HOSPITAL ENCOUNTER (OUTPATIENT)
Dept: PHYSICAL THERAPY | Age: 32
Setting detail: THERAPIES SERIES
Discharge: HOME | End: 2022-10-25
Payer: COMMERCIAL

## 2022-10-25 PROCEDURE — 97140 MANUAL THERAPY 1/> REGIONS: CPT

## 2022-10-25 PROCEDURE — 97110 THERAPEUTIC EXERCISES: CPT
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
## 2022-10-27 ENCOUNTER — HOSPITAL ENCOUNTER (OUTPATIENT)
Dept: PHYSICAL THERAPY | Age: 32
Setting detail: THERAPIES SERIES
Discharge: HOME | End: 2022-10-27
Payer: COMMERCIAL

## 2022-10-27 PROCEDURE — 97110 THERAPEUTIC EXERCISES: CPT

## 2022-10-27 PROCEDURE — 97140 MANUAL THERAPY 1/> REGIONS: CPT
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
## 2022-11-01 ENCOUNTER — HOSPITAL ENCOUNTER (OUTPATIENT)
Dept: PHYSICAL THERAPY | Age: 32
Setting detail: THERAPIES SERIES
Discharge: HOME | End: 2022-11-01
Payer: COMMERCIAL

## 2022-11-01 PROCEDURE — 97110 THERAPEUTIC EXERCISES: CPT

## 2022-11-01 PROCEDURE — 97140 MANUAL THERAPY 1/> REGIONS: CPT
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
## 2022-11-02 ENCOUNTER — OFFICE VISIT (OUTPATIENT)
Dept: ORTHOPEDIC SURGERY | Age: 32
End: 2022-11-02
Payer: COMMERCIAL

## 2022-11-02 VITALS — HEIGHT: 72 IN | BODY MASS INDEX: 22.35 KG/M2 | WEIGHT: 165 LBS

## 2022-11-02 DIAGNOSIS — M41.9 SCOLIOSIS OF THORACOLUMBAR SPINE, UNSPECIFIED SCOLIOSIS TYPE: Primary | ICD-10-CM

## 2022-11-02 PROCEDURE — G8484 FLU IMMUNIZE NO ADMIN: HCPCS | Performed by: PHYSICIAN ASSISTANT

## 2022-11-02 PROCEDURE — G8420 CALC BMI NORM PARAMETERS: HCPCS | Performed by: PHYSICIAN ASSISTANT

## 2022-11-02 PROCEDURE — G8427 DOCREV CUR MEDS BY ELIG CLIN: HCPCS | Performed by: PHYSICIAN ASSISTANT

## 2022-11-02 PROCEDURE — 99213 OFFICE O/P EST LOW 20 MIN: CPT | Performed by: PHYSICIAN ASSISTANT

## 2022-11-02 PROCEDURE — 1036F TOBACCO NON-USER: CPT | Performed by: PHYSICIAN ASSISTANT

## 2022-11-02 NOTE — PROGRESS NOTES
Subjective:      Patient ID: Danielle Carrion is a 27 y.o. female who is here for follow up evaluation of low back pain. She states overall her symptoms are continuing to improve with medications and physical therapy. She denies any radicular pain. She states she had a little bit increase of low back pain yesterday after starting new exercises but today is feeling a little bit better. Pain today 6/10 VAS. Review Of Systems:   She denies radicular symptoms into the left or right lower extremity. She denies bladder or bowel changes, saddle anesthesia. Past Medical History:   Diagnosis Date    Chronic right-sided thoracic back pain 06/07/2016    Depression 12/20/2018       Family History   Problem Relation Age of Onset    High Blood Pressure Mother        History reviewed. No pertinent surgical history. Social History     Occupational History    Not on file   Tobacco Use    Smoking status: Never    Smokeless tobacco: Never   Vaping Use    Vaping Use: Some days    Substances: THC    Devices: [employer] tank   Substance and Sexual Activity    Alcohol use: Yes     Comment: once every 2 months    Drug use: No    Sexual activity: Yes     Partners: Male       Current Outpatient Medications   Medication Sig Dispense Refill    ibuprofen (ADVIL;MOTRIN) 600 MG tablet Take 1 tablet by mouth every 8 hours as needed for Pain 15 tablet 0     No current facility-administered medications for this visit. Objective:     Ht 6' (1.829 m)   Wt 165 lb (74.8 kg)   BMI 22.38 kg/m²         She is well-developed and well-nourished, is in obvious discomfort and alert and oriented to person, place, and time. She demonstrates appropriate mood and affect. Her skin is warm and dry. Her gait is normal and she walks heel to toe without significant limp or instability. Back:  She stands with slight lumbar flexion. Her lumbar flexion, extension and lateral bending are mildly reduced with pain.    She has mild tenderness over her lumbar spine without obvious muscle spasm. The skin over her lumbar spine is normal without a surgical scar. X Rays: not performed in the office today:       Diagnosis:       ICD-10-CM    1. Scoliosis of thoracolumbar spine, unspecified scoliosis type  M41.9            Assessment and Plan:       Assessment:  Mild scoliosis of the thoracolumbar spine with improvement of low back pain symptoms with over-the-counter NSAIDs and therapy. She denies any radicular pain. I had an extensive discussion with Ms. Jenny Zuleta regarding the natural history, etiology, and long term consequences of her condition. I have presented reasonable alternatives to the patient's proposed care, treatment, and services. Risks and benefits of the treatment options also reviewed in detail. I have outlined a treatment plan with them. She has had full opportunity to ask her questions. I have answered them all to her satisfaction. I feel that Ms. Jenny Zuleta understands our discussion today. Plan:    PT-she will continue physical therapy. Further Imaging-MRI if only symptoms fail to improve or worsen. Follow up-    Call or return to clinic if these symptoms worsen or fail to improve as anticipated. The total time spent on today's visit including reviewing test results, history, performance of physical exam, counseling/ education, ordering of medications, tests or procedures was 22 minutes. This time does include completion of the medical record. This time excludes any time spent performing procedures or tests in the office. Sandra Ramirez PA-C   Senior Physician Assistant   [facility]                                         Disclaimer: This note was generated with use of a verbal recognition program (DRAGON) and an attempt was made to check for errors.   It is possible that there are still dictated errors within this office note.  If so, please bring any significant errors to my attention for an addendum. All efforts were made to ensure that this office note is accurate.

## 2022-11-03 ENCOUNTER — HOSPITAL ENCOUNTER (OUTPATIENT)
Dept: PHYSICAL THERAPY | Age: 32
Setting detail: THERAPIES SERIES
Discharge: HOME | End: 2022-11-03
Payer: COMMERCIAL

## 2022-11-03 ENCOUNTER — OFFICE VISIT (OUTPATIENT)
Dept: PRIMARY CARE CLINIC | Age: 32
End: 2022-11-03
Payer: COMMERCIAL

## 2022-11-03 VITALS
SYSTOLIC BLOOD PRESSURE: 122 MMHG | WEIGHT: 165 LBS | DIASTOLIC BLOOD PRESSURE: 75 MMHG | HEIGHT: 72 IN | HEART RATE: 75 BPM | BODY MASS INDEX: 22.35 KG/M2 | TEMPERATURE: 97.9 F

## 2022-11-03 DIAGNOSIS — R53.83 OTHER FATIGUE: ICD-10-CM

## 2022-11-03 DIAGNOSIS — Z78.9 NO HISTORY OF MEASLES, MUMPS, RUBELLA (MMR) VACCINATION: ICD-10-CM

## 2022-11-03 DIAGNOSIS — Z00.00 ENCOUNTER FOR WELL ADULT EXAM WITHOUT ABNORMAL FINDINGS: ICD-10-CM

## 2022-11-03 DIAGNOSIS — Z11.1 SCREENING FOR TUBERCULOSIS: Primary | ICD-10-CM

## 2022-11-03 PROCEDURE — 99395 PREV VISIT EST AGE 18-39: CPT | Performed by: INTERNAL MEDICINE

## 2022-11-03 PROCEDURE — G8484 FLU IMMUNIZE NO ADMIN: HCPCS | Performed by: INTERNAL MEDICINE

## 2022-11-03 SDOH — ECONOMIC STABILITY: FOOD INSECURITY: WITHIN THE PAST 12 MONTHS, YOU WORRIED THAT YOUR FOOD WOULD RUN OUT BEFORE YOU GOT MONEY TO BUY MORE.: NEVER TRUE

## 2022-11-03 SDOH — ECONOMIC STABILITY: FOOD INSECURITY: WITHIN THE PAST 12 MONTHS, THE FOOD YOU BOUGHT JUST DIDN'T LAST AND YOU DIDN'T HAVE MONEY TO GET MORE.: NEVER TRUE

## 2022-11-03 ASSESSMENT — SOCIAL DETERMINANTS OF HEALTH (SDOH): HOW HARD IS IT FOR YOU TO PAY FOR THE VERY BASICS LIKE FOOD, HOUSING, MEDICAL CARE, AND HEATING?: NOT HARD AT ALL

## 2022-11-03 NOTE — FLOWSHEET NOTE
[facility], [facility]  [address] [facility] [place], [facility]  Phone: [phone]   Fax:     [phone]    Physical Therapy  Cancellation/No-show Note  Patient Name:  Darion Solorzano  :  1990   Date:  2022  Cancelled visits to date: 1  No-shows to date: 0    Patient status for today's appointment patient:  [x]  Cancelled  []  Rescheduled appointment  []  No-show     Reason given by patient:  []  Patient ill  []  Conflicting appointment  []  No transportation    []  Conflict with work  []  No reason given  [x]  Other:  son fractured his elbow   Comments:      Phone call information:   []  Phone call made today to patient at _ time at number provided:      []  Patient answered, conversation as follows:    []  Patient did not answer, message left as follows:  [x]  Phone call not made today    Electronically signed by:  Maria T Tadeo, PTMPT [id number]
patient

## 2022-11-03 NOTE — PROGRESS NOTES
Well Adult Note  Name: Noreen Rodrigues Date: 11/3/2022   MRN: [id number] Sex: Female   Age: 27 y.o. Ethnicity: Non- / Non    : 1990 Race: Chris Aguirre / African American      Antonio Bond is here for well adult exam.  History:  The patient has been fatigued recently and needs a work-up to find out why. I am including a sleep study as well as multiple labs to work this up. 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 This is considered a well visit with focus on her fatigue and the need for labs to complete her database. Review of Systems    No Known Allergies      Prior to Visit Medications    Medication Sig Taking? Authorizing Provider   ibuprofen (ADVIL;MOTRIN) 600 MG tablet Take 1 tablet by mouth every 8 hours as needed for Pain  Peter López MD         Past Medical History:   Diagnosis Date    Chronic right-sided thoracic back pain 2016    Depression 2018       No past surgical history on file. Family History   Problem Relation Age of Onset    High Blood Pressure Mother        Social History     Tobacco Use    Smoking status: Never    Smokeless tobacco: Never   Vaping Use    Vaping Use: Some days    Substances: THC    Devices: [employer]   Substance Use Topics    Alcohol use: Yes     Comment: once every 2 months    Drug use: No       Objective   /75   Pulse 75   Temp 97.9 °F (36.6 °C) (Temporal)   Ht 6' (1.829 m)   Wt 165 lb (74.8 kg)   BMI 22.38 kg/m²   Wt Readings from Last 3 Encounters:   22 165 lb (74.8 kg)   22 165 lb (74.8 kg)   22 165 lb (74.8 kg)     There were no vitals filed for this visit. Physical Exam      Assessment   Plan   1. Screening for tuberculosis  -     Quantiferon, Incubated; Future  2. No history of measles, mumps, rubella (MMR) vaccination  -     Mumps, Measles, Rubella, and Varicella Zoster, IgG; Future  3. Other fatigue  -     Comprehensive Metabolic Panel;  Future  -     [facility] Sleep Medicine  -     CBC with Auto Differential; Future  -     Iron and TIBC; Future  -     Lipid, Fasting; Future  -     Hemoglobin A1C; Future  -     TSH with Reflex to FT4; Future  -     Urinalysis; Future  -     Vitamin D 25 Hydroxy; Future  -     Vitamin B12 & Folate; Future         Personalized Preventive Plan   Current Health Maintenance Status  Immunization History   Administered Date(s) Administered    COVID-19, PFIZER GRAY top, DO NOT Dilute, (age 15 y+), IM, 30 mcg/0.3 mL 07/01/2022    COVID-19, PFIZER PURPLE top, DILUTE for use, (age 15 y+), 30mcg/0.3mL 10/08/2021, 10/29/2021    DTP 1990, 03/02/1989, 09/01/1989    PPD Test 04/10/2017, 04/18/2017    Tdap (Boostrix, Adacel) 10/02/2014, 06/07/2016, 02/13/2022        Health Maintenance   Topic Date Due    Varicella vaccine (1 of 2 - 2-dose childhood series) Never done    Depression Monitoring  Never done    Hepatitis C screen  Never done    Cervical cancer screen  Never done    Flu vaccine (1) Never done    COVID-19 Vaccine (4 - Booster for Steven Peter series) 08/26/2022    DTaP/Tdap/Td vaccine (7 - Td or Tdap) 02/13/2032    HIV screen  Completed    Hepatitis A vaccine  Aged Out    Hib vaccine  Aged Out    Meningococcal (ACWY) vaccine  Aged Out    Pneumococcal 0-64 years Vaccine  Aged Out     Recommendations for AND[employer]ze Due: see orders and patient instructions/AVS.    Return in about 3 months (around 2/3/2023).

## 2022-11-03 NOTE — PATIENT INSTRUCTIONS
You will receive your form back once the lab results are in. Please follow-up with the sleep medicine physician to see if you have sleep apnea as the cause of your snoring and chronic fatigue. See me again in 3 months. Continue any medications that you are currently taking.

## 2022-11-08 ENCOUNTER — HOSPITAL ENCOUNTER (OUTPATIENT)
Dept: PHYSICAL THERAPY | Age: 32
Setting detail: THERAPIES SERIES
Discharge: HOME | End: 2022-11-08
Payer: COMMERCIAL

## 2022-11-08 PROCEDURE — 97110 THERAPEUTIC EXERCISES: CPT

## 2022-11-08 PROCEDURE — 97140 MANUAL THERAPY 1/> REGIONS: CPT
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
## 2022-11-10 ENCOUNTER — HOSPITAL ENCOUNTER (OUTPATIENT)
Dept: PHYSICAL THERAPY | Age: 32
Setting detail: THERAPIES SERIES
Discharge: HOME | End: 2022-11-10
Payer: COMMERCIAL

## 2022-11-10 NOTE — FLOWSHEET NOTE
[facility], [facility]  [address] [place], [facility]  Phone: [phone]   Fax:     [phone]    Physical Therapy  Cancellation/No-show Note  Patient Name:  Antonio Bond  :  1990   Date:  11/10/2022  Cancelled visits to date: 1  No-shows to date: 1    Patient status for today's appointment patient:  []  Cancelled  []  Rescheduled appointment  [x]  No-show     Reason given by patient:  []  Patient ill  []  Conflicting appointment  []  No transportation    []  Conflict with work  [x]  No reason given  []  Other:     Comments:      Phone call information:   [x]  Phone call made today to patient at _ time at number provided:      []  Patient answered, conversation as follows:    [x]  Patient did not answer, message left as follows:Called patient regarding missed appointment. They were reminded of the attendance policy and might be discharged if they miss again. Reminded them of their next appointment time and date and to call if they are going to miss.     []  Phone call not made today    Electronically signed by:  Vianey Chávez, PTMPT [id number]

## 2022-11-15 ENCOUNTER — HOSPITAL ENCOUNTER (OUTPATIENT)
Dept: PHYSICAL THERAPY | Age: 32
Setting detail: THERAPIES SERIES
Discharge: HOME | End: 2022-11-15
Payer: COMMERCIAL

## 2022-11-15 PROCEDURE — 97530 THERAPEUTIC ACTIVITIES: CPT

## 2022-11-15 PROCEDURE — 97110 THERAPEUTIC EXERCISES: CPT
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
## 2022-11-18 DIAGNOSIS — Z78.9 NO HISTORY OF MEASLES, MUMPS, RUBELLA (MMR) VACCINATION: ICD-10-CM

## 2022-11-18 DIAGNOSIS — R53.83 OTHER FATIGUE: ICD-10-CM

## 2022-11-18 DIAGNOSIS — Z11.1 SCREENING FOR TUBERCULOSIS: ICD-10-CM

## 2022-11-18 LAB
A/G RATIO: 1.5 (ref 1.1–2.2)
ALBUMIN SERPL-MCNC: 4.1 G/DL (ref 3.4–5)
ALP BLD-CCNC: 52 U/L (ref 40–129)
ALT SERPL-CCNC: 8 U/L (ref 10–40)
ANION GAP SERPL CALCULATED.3IONS-SCNC: 7 MMOL/L (ref 3–16)
AST SERPL-CCNC: 12 U/L (ref 15–37)
BACTERIA: ABNORMAL /HPF
BASOPHILS ABSOLUTE: 0 K/UL (ref 0–0.2)
BASOPHILS RELATIVE PERCENT: 0.5 %
BILIRUB SERPL-MCNC: 0.7 MG/DL (ref 0–1)
BILIRUBIN URINE: NEGATIVE
BLOOD, URINE: NEGATIVE
BUN BLDV-MCNC: 12 MG/DL (ref 7–20)
CALCIUM SERPL-MCNC: 8.8 MG/DL (ref 8.3–10.6)
CHLORIDE BLD-SCNC: 103 MMOL/L (ref 99–110)
CHOLESTEROL, FASTING: 152 MG/DL (ref 0–199)
CLARITY: CLEAR
CO2: 26 MMOL/L (ref 21–32)
COLOR: YELLOW
CREAT SERPL-MCNC: 0.8 MG/DL (ref 0.6–1.1)
EOSINOPHILS ABSOLUTE: 0.1 K/UL (ref 0–0.6)
EOSINOPHILS RELATIVE PERCENT: 1.4 %
EPITHELIAL CELLS, UA: 12 /HPF (ref 0–5)
FOLATE: 15.83 NG/ML (ref 4.78–24.2)
GFR SERPL CREATININE-BSD FRML MDRD: >60 ML/MIN/{1.73_M2}
GLUCOSE BLD-MCNC: 87 MG/DL (ref 70–99)
GLUCOSE URINE: NEGATIVE MG/DL
HCT VFR BLD CALC: 37.4 % (ref 36–48)
HDLC SERPL-MCNC: 66 MG/DL (ref 40–60)
HEMOGLOBIN: 11.8 G/DL (ref 12–16)
HYALINE CASTS: 0 /LPF (ref 0–8)
IRON SATURATION: 54 % (ref 15–50)
IRON: 151 UG/DL (ref 37–145)
KETONES, URINE: NEGATIVE MG/DL
LDL CHOLESTEROL CALCULATED: 77 MG/DL
LEUKOCYTE ESTERASE, URINE: ABNORMAL
LYMPHOCYTES ABSOLUTE: 1.8 K/UL (ref 1–5.1)
LYMPHOCYTES RELATIVE PERCENT: 34.8 %
MCH RBC QN AUTO: 27.7 PG (ref 26–34)
MCHC RBC AUTO-ENTMCNC: 31.6 G/DL (ref 31–36)
MCV RBC AUTO: 87.6 FL (ref 80–100)
MICROSCOPIC EXAMINATION: YES
MONOCYTES ABSOLUTE: 0.5 K/UL (ref 0–1.3)
MONOCYTES RELATIVE PERCENT: 9.5 %
NEUTROPHILS ABSOLUTE: 2.7 K/UL (ref 1.7–7.7)
NEUTROPHILS RELATIVE PERCENT: 53.8 %
NITRITE, URINE: NEGATIVE
PDW BLD-RTO: 13.6 % (ref 12.4–15.4)
PH UA: 7 (ref 5–8)
PLATELET # BLD: 191 K/UL (ref 135–450)
PMV BLD AUTO: 10.5 FL (ref 5–10.5)
POTASSIUM SERPL-SCNC: 3.5 MMOL/L (ref 3.5–5.1)
PROTEIN UA: NEGATIVE MG/DL
RBC # BLD: 4.27 M/UL (ref 4–5.2)
RBC UA: 3 /HPF (ref 0–4)
SODIUM BLD-SCNC: 136 MMOL/L (ref 136–145)
SPECIFIC GRAVITY UA: 1.02 (ref 1–1.03)
TOTAL IRON BINDING CAPACITY: 282 UG/DL (ref 260–445)
TOTAL PROTEIN: 6.8 G/DL (ref 6.4–8.2)
TRIGLYCERIDE, FASTING: 46 MG/DL (ref 0–150)
TSH REFLEX FT4: 0.71 UIU/ML (ref 0.27–4.2)
URINE TYPE: ABNORMAL
UROBILINOGEN, URINE: 0.2 E.U./DL
VITAMIN B-12: 487 PG/ML (ref 211–911)
VITAMIN D 25-HYDROXY: 25.9 NG/ML
VLDLC SERPL CALC-MCNC: 9 MG/DL
WBC # BLD: 5 K/UL (ref 4–11)
WBC UA: 6 /HPF (ref 0–5)

## 2022-11-19 LAB
ESTIMATED AVERAGE GLUCOSE: 96.8 MG/DL
HBA1C MFR BLD: 5 %
MEASLES IMMUNE (IGG): NORMAL
MUMPS AB IGG: NORMAL
RUBELLA ANTIBODY IGG: NORMAL
VARICELLA-ZOSTER VIRUS AB, IGG: NORMAL

## 2022-11-23 LAB
QUANTI TB GOLD PLUS: NEGATIVE
QUANTI TB1 MINUS NIL: 0 IU/ML (ref 0–0.34)
QUANTI TB2 MINUS NIL: 0 IU/ML (ref 0–0.34)
QUANTIFERON MITOGEN: >10 IU/ML
QUANTIFERON NIL: 0.08 IU/ML

## 2022-12-03 PROBLEM — Z11.1 SCREENING FOR TUBERCULOSIS: Status: RESOLVED | Noted: 2022-11-03 | Resolved: 2022-12-03

## 2022-12-28 ENCOUNTER — OFFICE VISIT (OUTPATIENT)
Dept: SLEEP MEDICINE | Age: 32
End: 2022-12-28
Payer: COMMERCIAL

## 2022-12-28 VITALS
WEIGHT: 171.6 LBS | HEART RATE: 80 BPM | SYSTOLIC BLOOD PRESSURE: 115 MMHG | HEIGHT: 72 IN | TEMPERATURE: 97.5 F | RESPIRATION RATE: 21 BRPM | DIASTOLIC BLOOD PRESSURE: 70 MMHG | BODY MASS INDEX: 23.24 KG/M2 | OXYGEN SATURATION: 99 %

## 2022-12-28 DIAGNOSIS — R53.83 OTHER FATIGUE: ICD-10-CM

## 2022-12-28 DIAGNOSIS — G47.19 EXCESSIVE DAYTIME SLEEPINESS: ICD-10-CM

## 2022-12-28 DIAGNOSIS — G47.33 OSA (OBSTRUCTIVE SLEEP APNEA): Primary | ICD-10-CM

## 2022-12-28 DIAGNOSIS — G47.00 INSOMNIA WITH SLEEP APNEA: ICD-10-CM

## 2022-12-28 DIAGNOSIS — G47.30 INSOMNIA WITH SLEEP APNEA: ICD-10-CM

## 2022-12-28 DIAGNOSIS — R06.83 SNORING: ICD-10-CM

## 2022-12-28 PROCEDURE — G8427 DOCREV CUR MEDS BY ELIG CLIN: HCPCS | Performed by: PSYCHIATRY & NEUROLOGY

## 2022-12-28 PROCEDURE — G8484 FLU IMMUNIZE NO ADMIN: HCPCS | Performed by: PSYCHIATRY & NEUROLOGY

## 2022-12-28 PROCEDURE — 99204 OFFICE O/P NEW MOD 45 MIN: CPT | Performed by: PSYCHIATRY & NEUROLOGY

## 2022-12-28 PROCEDURE — G8420 CALC BMI NORM PARAMETERS: HCPCS | Performed by: PSYCHIATRY & NEUROLOGY

## 2022-12-28 PROCEDURE — 1036F TOBACCO NON-USER: CPT | Performed by: PSYCHIATRY & NEUROLOGY

## 2022-12-28 ASSESSMENT — SLEEP AND FATIGUE QUESTIONNAIRES
HOW LIKELY ARE YOU TO NOD OFF OR FALL ASLEEP WHEN YOU ARE A PASSENGER IN A CAR FOR AN HOUR WITHOUT A BREAK: 2
HOW LIKELY ARE YOU TO NOD OFF OR FALL ASLEEP IN A CAR, WHILE STOPPED FOR A FEW MINUTES IN TRAFFIC: 2
NECK CIRCUMFERENCE (INCHES): 13
HOW LIKELY ARE YOU TO NOD OFF OR FALL ASLEEP WHILE SITTING QUIETLY AFTER LUNCH WITHOUT ALCOHOL: 2
HOW LIKELY ARE YOU TO NOD OFF OR FALL ASLEEP WHILE SITTING AND TALKING TO SOMEONE: 2
HOW LIKELY ARE YOU TO NOD OFF OR FALL ASLEEP WHILE LYING DOWN TO REST IN THE AFTERNOON WHEN CIRCUMSTANCES PERMIT: 3
HOW LIKELY ARE YOU TO NOD OFF OR FALL ASLEEP WHILE SITTING INACTIVE IN A PUBLIC PLACE: 2
ESS TOTAL SCORE: 17
HOW LIKELY ARE YOU TO NOD OFF OR FALL ASLEEP WHILE SITTING AND READING: 2
HOW LIKELY ARE YOU TO NOD OFF OR FALL ASLEEP WHILE WATCHING TV: 2

## 2022-12-28 ASSESSMENT — ENCOUNTER SYMPTOMS
ALLERGIC/IMMUNOLOGIC NEGATIVE: 1
APNEA: 0
GASTROINTESTINAL NEGATIVE: 1
EYES NEGATIVE: 1

## 2022-12-28 NOTE — PATIENT INSTRUCTIONS
Sleep compression between 11:30 and 7 AM, no naps.        Orders Placed This Encounter   Procedures    Baseline Diagnostic Sleep Study     Standing Status:   Future     Standing Expiration Date:   12/28/2023     Order Specific Question:   Adult or Pediatric     Answer:   Adult Study (>7 Years)     Order Specific Question:   Location For Sleep Study     Answer:   [facility]     Order Specific Question:   [place]   [facility] [place] with [place] Tit[place]     Standing Status:   Future     Standing Expiration Date:   12/28/2023     Order Specific Question:   Sleep Study Titration Type     Answer:   CPAP     Order Specific Question:   Location For Sleep Study     Answer:   [facility]     Order Specific Question:   [place] Lab [place]     [place]   [facility] [place]

## 2022-12-28 NOTE — PROGRESS NOTES
MD BETTE Hoyt Board Certified in Sleep Medicine  Certified [place] Sleep Medicine  Board Certified in Neurology Dennis Duran [phone] [facility], [address]  [address], [address]           [address] [facility] SLEEP MEDICINE [address] [address] [phone]    Subjective:     Patient ID: Alfredo Ruffin is a 27 y.o. female. Chief Complaint   Patient presents with    Establish Care         HPI:        Alfredo Ruffin is a 27 y.o. female referred by Dr. Rodolfo Falcon for a sleep evaluation. She complains of snoring, snorting, tossing and turning, excessive daytime sleepiness, feels sleepy during the day, fatigue but she denies choking, periods of not breathing, knees buckling with laughing, completely or partially paralyzed while falling asleep or waking up, take naps during the day, noisy environment, uncomfortable room temperature, uncomfortable bedding. Symptoms began several years ago, unchanged since that time. The patient's bed-partner confirmed the snoring without the stopped breathing at night. SLEEP SCHEDULE: Goes to bed around 10:30 PM in the weekdays and 10:30 PM in the weekends. It usually takes the patient 2-3 hours to fall asleep. The patient gets up 0 per night to go to the bathroom. The Patient finally gets up at 7 AM during the weekdays and 7 AM in the weekends. patient wakes up with dry mouth. The patient has restless sleep with frequent arousals in addition to the Patient has significant daytime sleepiness. The Patient scored [place] Sleepiness Score: 17 on [place] Sleepiness Scale ( more than 10 is indicative of daytime sleepiness)and 40 in fatigue scale ( more than 36 is indicative of daytime fatigue). The patient takes no naps.     Previous evaluation and treatment has included- none.    Insomnia with sleep initiation and maintaining, usually takes the patient 2-4 hours to fall in sleep, wakes up 2-4 times for few  minutes eachtime to fall back in sleep, usually stays in bed trying to fall in sleep, this might happened 7 nights a week. DOT/CDL - N/A  ALEJANDRO/'cynthia - N/A  The patient depression is stable. Previous Report(s) Reviewed: historical medical records       Social History     Socioeconomic History    Marital status: Single     Spouse name: Not on file    Number of children: Not on file    Years of education: Not on file    Highest education level: Not on file   Occupational History    Not on file   Tobacco Use    Smoking status: Never    Smokeless tobacco: Never   Vaping Use    Vaping Use: Some days    Substances: THC    Devices: Refillable tank   Substance and Sexual Activity    Alcohol use: Yes     Comment: once every 2 months    Drug use: No    Sexual activity: Yes     Partners: Male   Other Topics Concern    Not on file   Social History Narrative    Not on file     Social Determinants of Health     Financial Resource Strain: Low Risk     Difficulty of Paying Living Expenses: Not hard at all   Food Insecurity: No Food Insecurity    Worried About Running Out of Food in the Last Year: Never true    Ran Out of Food in the Last Year: Never true   Transportation Needs: Not on file   Physical Activity: Not on file   Stress: Not on file   Social Connections: Not on file   Intimate Partner Violence: Not on file   Housing Stability: Not on file       Prior to Admission medications    Medication Sig Start Date End Date Taking?  Authorizing Provider   ibuprofen (ADVIL;MOTRIN) 600 MG tablet Take 1 tablet by mouth every 8 hours as needed for Pain 9/15/22 9/20/22  Carin Acuna MD       Allergies as of 12/28/2022    (No Known Allergies)       Patient Active Problem List   Diagnosis    Chronic right-sided thoracic back pain    Alopecia    Low back pain    Scoliosis (and kyphoscoliosis), idiopathic    Depression    Facial rash    Scoliosis of thoracolumbar spine    No history of measles, mumps, rubella (MMR) vaccination    Other fatigue       Past Medical History:   Diagnosis Date    Chronic right-sided thoracic back pain 06/07/2016    Depression 12/20/2018       No past surgical history on file. Family History   Problem Relation Age of Onset    High Blood Pressure Mother        Review of Systems   Constitutional:  Positive for fatigue. Negative for diaphoresis. HENT: Negative. Eyes: Negative. Respiratory:  Negative for apnea. Cardiovascular: Negative. Negative for leg swelling. Gastrointestinal: Negative. Endocrine: Negative. Genitourinary: Negative. Musculoskeletal: Negative. Skin: Negative. Allergic/Immunologic: Negative. Neurological:  Positive for headaches. Hematological: Negative. Psychiatric/Behavioral:  Positive for dysphoric mood (mild) and sleep disturbance. The patient is nervous/anxious. Objective:     Vitals:  Weight BMI Neck circumference    Wt Readings from Last 3 Encounters:   12/28/22 171 lb 9.6 oz (77.8 kg)   11/03/22 165 lb (74.8 kg)   11/02/22 165 lb (74.8 kg)    Body mass index is 23.27 kg/m². Neck circumference (Inches): 13     BP HR SaO2   BP Readings from Last 3 Encounters:   12/28/22 115/70   11/03/22 122/75   09/15/22 118/75    Pulse Readings from Last 3 Encounters:   12/28/22 80   11/03/22 75   09/15/22 66    SpO2 Readings from Last 3 Encounters:   12/28/22 99%   09/15/22 100%   02/13/22 99%        The mandibular molar Class :   []1 [x]2 []3      Mallampati I Airway Classification:   []1 []2 [x]3 []4        Physical Exam  Vitals and nursing note reviewed. Constitutional:       Appearance: Normal appearance. HENT:      Head: Atraumatic.       Nose: Nose normal.      Mouth/Throat:      Comments: Mallampati class 3, moderate retrognathia or hypognathia , normal airflow in bilateral nostrils, no septum deviation , crowded oropharynx, high arched hard palate,no tonsils enlargement. Eyes:      Extraocular Movements: Extraocular movements intact. Cardiovascular:      Rate and Rhythm: Normal rate and regular rhythm. Pulmonary:      Effort: Pulmonary effort is normal.      Breath sounds: Normal breath sounds. Musculoskeletal:      Cervical back: Neck supple. Right lower leg: No edema. Left lower leg: No edema. Neurological:      Mental Status: Mental status is at baseline. Psychiatric:         Mood and Affect: Mood normal.       Assessment:    Obstructive sleep apnea especially with snoring, snorting,  observed apnea, daytime sleepiness, large neck circumference, Mallampati class of 3 and obesity. Diagnosis Orders   1. BAKARI (obstructive sleep apnea)  Baseline Diagnostic Sleep Study    Sleep Study with PAP Titration      2. Other fatigue        3. Insomnia with sleep apnea        4. Snoring        5. Excessive daytime sleepiness          Plan:   Sleep compression between 11:30 and 7 AM, no naps. Patient was counseled about the pathophysiology of obstructive sleep apnea syndrome and the methods for evaluating its presence and severity. Patient was counseled to avoid driving and other potentially hazardous circumstances if the patient is experiencing excessive sleepiness. Treatment considerations include the use of nasal CPAP, oral dental appliance or a surgical intervention, which should be based on otolarygologic findings, In the meantime, the patient should be cautioned to avoid the use of alcohol or other depressant medications because of potential for increasing the duration and severity of apnea and cautioned regarding driving or operating and dangerous equipment if the patient is experiencing daytime sleepiness. .       Orders Placed This Encounter   Procedures    Baseline Diagnostic Sleep Study    Sleep Study with PAP Titration         Return for F/U between 31 and 90 days from the recieving CPAP.     Anita Rosales MD  Medical Director - [facility]

## 2023-01-17 ENCOUNTER — HOSPITAL ENCOUNTER (OUTPATIENT)
Dept: SLEEP CENTER | Age: 33
Discharge: HOME | End: 2023-01-17
Payer: COMMERCIAL

## 2023-01-17 DIAGNOSIS — G47.33 OSA (OBSTRUCTIVE SLEEP APNEA): ICD-10-CM

## 2023-01-17 PROCEDURE — 95810 POLYSOM 6/> YRS 4/> PARAM: CPT

## 2023-01-18 PROBLEM — G47.10 HYPERSOMNIA: Status: ACTIVE | Noted: 2023-01-18

## 2023-01-20 ENCOUNTER — TELEPHONE (OUTPATIENT)
Dept: PULMONOLOGY | Age: 33
End: 2023-01-20

## 2023-03-15 ENCOUNTER — HOSPITAL ENCOUNTER (EMERGENCY)
Age: 33
Discharge: HOME | End: 2023-03-16
Attending: EMERGENCY MEDICINE
Payer: MEDICAID

## 2023-03-15 VITALS
DIASTOLIC BLOOD PRESSURE: 72 MMHG | HEIGHT: 72 IN | BODY MASS INDEX: 23.14 KG/M2 | RESPIRATION RATE: 16 BRPM | WEIGHT: 170.86 LBS | HEART RATE: 80 BPM | SYSTOLIC BLOOD PRESSURE: 128 MMHG | TEMPERATURE: 98.8 F | OXYGEN SATURATION: 100 %

## 2023-03-15 DIAGNOSIS — N93.9 VAGINAL BLEEDING: Primary | ICD-10-CM

## 2023-03-15 PROCEDURE — 84703 CHORIONIC GONADOTROPIN ASSAY: CPT

## 2023-03-15 PROCEDURE — 99283 EMERGENCY DEPT VISIT LOW MDM: CPT

## 2023-03-15 ASSESSMENT — LIFESTYLE VARIABLES
HOW MANY STANDARD DRINKS CONTAINING ALCOHOL DO YOU HAVE ON A TYPICAL DAY: PATIENT DOES NOT DRINK
HOW OFTEN DO YOU HAVE A DRINK CONTAINING ALCOHOL: NEVER

## 2023-03-16 LAB — HCG UR QL: NEGATIVE

## 2023-03-16 ASSESSMENT — PAIN - FUNCTIONAL ASSESSMENT: PAIN_FUNCTIONAL_ASSESSMENT: NONE - DENIES PAIN

## 2023-03-16 NOTE — ED TRIAGE NOTES
Patient to the ER with complaints of some vaginal bleeding that she just noticed while in the shower. Patient says it was dark red and running down her leg with the water. She did not notice any clots and came straight to the hospital upon getting out of the shower. Patient found out that she was pregnant recently and was estimated to be about 6 weeks pregnant but has not had her first OB appointment yet. Denies any cramping or pain at this time.

## 2023-03-16 NOTE — ED PROVIDER NOTES
CHIEF COMPLAINT  Chief Complaint   Patient presents with    Vaginal Bleeding     Pregnant        HISTORY OF PRESENT ILLNESS  Johnna Rivera is a 27 y.o. female who presents to the ED complaining of having had a positive pregnancy test at home about 9 days ago and then noted onset of small amount of vaginal bleeding this evening. Patient reports no abdominal pain or vaginal discharge. No dysuria or hematuria. No back pain. No presyncope. Patient is a     No other complaints, modifying factors or associated symptoms. Nursing notes reviewed. Past Medical History:   Diagnosis Date    Chronic right-sided thoracic back pain 2016    Depression 2018     History reviewed. No pertinent surgical history.   Family History   Problem Relation Age of Onset    High Blood Pressure Mother      Social History     Socioeconomic History    Marital status: Single     Spouse name: Not on file    Number of children: Not on file    Years of education: Not on file    Highest education level: Not on file   Occupational History    Not on file   Tobacco Use    Smoking status: Never    Smokeless tobacco: Never   Vaping Use    Vaping Use: Some days    Substances: THC    Devices: Refillable tank   Substance and Sexual Activity    Alcohol use: Yes     Comment: once every 2 months    Drug use: No    Sexual activity: Yes     Partners: Male   Other Topics Concern    Not on file   Social History Narrative    Not on file     Social Determinants of Health     Financial Resource Strain: Low Risk     Difficulty of Paying Living Expenses: Not hard at all   Food Insecurity: No Food Insecurity    Worried About Running Out of Food in the Last Year: Never true    Ran Out of Food in the Last Year: Never true   Transportation Needs: Not on file   Physical Activity: Not on file   Stress: Not on file   Social Connections: Not on file   Intimate Partner Violence: Not on file   Housing Stability: Not on file     No current facility-administered medications for this encounter. Current Outpatient Medications   Medication Sig Dispense Refill    ibuprofen (ADVIL;MOTRIN) 600 MG tablet Take 1 tablet by mouth every 8 hours as needed for Pain 15 tablet 0     No Known Allergies    REVIEW OF SYSTEMS  Positives and pertinent negatives as per HPI. Six other systems were reviewed and are negative. Nursing notes pertaining to ROS were reviewed. PHYSICAL EXAM   /72   Pulse 80   Temp 98.8 °F (37.1 °C) (Oral)   Resp 16   Ht 6' (1.829 m)   Wt 170 lb 13.7 oz (77.5 kg)   LMP 08/22/2022   SpO2 100%   BMI 23.17 kg/m²   GENERAL APPEARANCE: Awake and alert. Cooperative. No acute distress. HEAD: Normocephalic. Atraumatic. EYES: PERRL. EOM's grossly intact. No scleral icterus, injection or exudate  ENT: Mucous membranes are moist.  NECK: Supple. Normal ROM. CHEST:  Regular rate and rhythm, no murmurs, rubs or gallops  LUNGS: Breathing is unlabored. Speaking comfortably in full sentences. Clear through auscultation bilaterally  ABDOMEN: Nondistended, nontender  EXTREMITIES: MAEE. No acute deformities. SKIN: Warm and dry. NEUROLOGICAL: Alert and oriented. LABS  I have reviewed all labs for this visit. Results for orders placed or performed during the hospital encounter of 03/15/23   Pregnancy, Urine   Result Value Ref Range    HCG(Urine) Pregnancy Test Negative Detects HCG level >20 MIU/mL       ED COURSE/MDM  Vaginal bleeding: Patient's pregnancy test today is negative. Patient is not experiencing a large amount of vaginal bleeding and patient has had no vaginal pain, vaginal discharge or abdominal pain.   It is possible that the patient's home pregnancy test was falsely positive or it is also possible that this was a very early true positive with now completed miscarriage and now negative pregnancy test.  Patient does not have any other signs or symptoms to suggest ectopic pregnancy in the setting of a negative pregnancy test.  Patient was advised to return to the emergency room for significantly worsening vaginal bleeding or abdominal pain. Follow-up with OB/GYN as needed. Patient is not having any other signs or symptoms to suggest acute vaginitis or PID. Patient was given scripts for the following medications. I counseled patient how to take these medications. New Prescriptions    No medications on file         CLINICAL IMPRESSION  1. Vaginal bleeding        Blood pressure 128/72, pulse 80, temperature 98.8 °F (37.1 °C), temperature source Oral, resp. rate 16, height 6' (1.829 m), weight 170 lb 13.7 oz (77.5 kg), last menstrual period 08/22/2022, SpO2 100 %, not currently breastfeeding.       Follow-up with:  Nadege Mantilla MD  [address] [facility]  [phone]    In 3 days  If symptoms worsen          Gen Watkins MD  03/16/23 0995

## 2023-09-28 ENCOUNTER — OFFICE VISIT (OUTPATIENT)
Dept: PRIMARY CARE CLINIC | Age: 33
End: 2023-09-28
Payer: COMMERCIAL

## 2023-09-28 VITALS
OXYGEN SATURATION: 99 % | HEIGHT: 72 IN | WEIGHT: 161 LBS | DIASTOLIC BLOOD PRESSURE: 71 MMHG | BODY MASS INDEX: 21.81 KG/M2 | TEMPERATURE: 97.7 F | SYSTOLIC BLOOD PRESSURE: 113 MMHG | HEART RATE: 100 BPM

## 2023-09-28 DIAGNOSIS — Z00.00 ENCOUNTER FOR WELL ADULT EXAM WITHOUT ABNORMAL FINDINGS: Primary | ICD-10-CM

## 2023-09-28 PROCEDURE — 99395 PREV VISIT EST AGE 18-39: CPT | Performed by: INTERNAL MEDICINE

## 2023-09-28 SDOH — ECONOMIC STABILITY: HOUSING INSECURITY
IN THE LAST 12 MONTHS, WAS THERE A TIME WHEN YOU DID NOT HAVE A STEADY PLACE TO SLEEP OR SLEPT IN A SHELTER (INCLUDING NOW)?: NO

## 2023-09-28 SDOH — ECONOMIC STABILITY: FOOD INSECURITY: WITHIN THE PAST 12 MONTHS, THE FOOD YOU BOUGHT JUST DIDN'T LAST AND YOU DIDN'T HAVE MONEY TO GET MORE.: NEVER TRUE

## 2023-09-28 SDOH — ECONOMIC STABILITY: FOOD INSECURITY: WITHIN THE PAST 12 MONTHS, YOU WORRIED THAT YOUR FOOD WOULD RUN OUT BEFORE YOU GOT MONEY TO BUY MORE.: NEVER TRUE

## 2023-09-28 SDOH — ECONOMIC STABILITY: INCOME INSECURITY: HOW HARD IS IT FOR YOU TO PAY FOR THE VERY BASICS LIKE FOOD, HOUSING, MEDICAL CARE, AND HEATING?: NOT HARD AT ALL

## 2023-09-28 ASSESSMENT — PATIENT HEALTH QUESTIONNAIRE - PHQ9
SUM OF ALL RESPONSES TO PHQ QUESTIONS 1-9: 0
10. IF YOU CHECKED OFF ANY PROBLEMS, HOW DIFFICULT HAVE THESE PROBLEMS MADE IT FOR YOU TO DO YOUR WORK, TAKE CARE OF THINGS AT HOME, OR GET ALONG WITH OTHER PEOPLE: 0
SUM OF ALL RESPONSES TO PHQ QUESTIONS 1-9: 0
6. FEELING BAD ABOUT YOURSELF - OR THAT YOU ARE A FAILURE OR HAVE LET YOURSELF OR YOUR FAMILY DOWN: 0
4. FEELING TIRED OR HAVING LITTLE ENERGY: 0
5. POOR APPETITE OR OVEREATING: 0
7. TROUBLE CONCENTRATING ON THINGS, SUCH AS READING THE NEWSPAPER OR WATCHING TELEVISION: 0
3. TROUBLE FALLING OR STAYING ASLEEP: 0
SUM OF ALL RESPONSES TO PHQ QUESTIONS 1-9: 0
8. MOVING OR SPEAKING SO SLOWLY THAT OTHER PEOPLE COULD HAVE NOTICED. OR THE OPPOSITE, BEING SO FIGETY OR RESTLESS THAT YOU HAVE BEEN MOVING AROUND A LOT MORE THAN USUAL: 0
1. LITTLE INTEREST OR PLEASURE IN DOING THINGS: 0
SUM OF ALL RESPONSES TO PHQ QUESTIONS 1-9: 0
2. FEELING DOWN, DEPRESSED OR HOPELESS: 0
9. THOUGHTS THAT YOU WOULD BE BETTER OFF DEAD, OR OF HURTING YOURSELF: 0
SUM OF ALL RESPONSES TO PHQ9 QUESTIONS 1 & 2: 0

## 2023-09-28 NOTE — PATIENT INSTRUCTIONS
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 Your immunization history was reviewed and is up-to-date. Follow-up with me in 1 year.

## 2023-09-30 NOTE — PROGRESS NOTES
Whitley Melo (:  1990) is a 26 y.o. female,Established patient, here for evaluation of the following chief complaint(s): Annual Exam    The patient is here for a well exam for employment. Her immunizations are up-to-date and her physical exam was within normal limits. 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 ASSESSMENT/PLAN:      Return in about 1 year (around 2024). Subjective   SUBJECTIVE/OBJECTIVE:  HPI    Review of Systems       Objective   Physical Exam       No problem-specific Assessment & Plan notes found for this encounter. On this date 2023 I have spent 25 minutes reviewing previous notes, test results and face to face with the patient discussing the diagnosis and importance of compliance with the treatment plan as well as documenting on the day of the visit. An electronic signature was used to authenticate this note.     --Haydee Granda MD